# Patient Record
Sex: FEMALE | Race: WHITE | NOT HISPANIC OR LATINO | Employment: FULL TIME | URBAN - METROPOLITAN AREA
[De-identification: names, ages, dates, MRNs, and addresses within clinical notes are randomized per-mention and may not be internally consistent; named-entity substitution may affect disease eponyms.]

---

## 2019-06-30 ENCOUNTER — APPOINTMENT (OUTPATIENT)
Dept: RADIOLOGY | Facility: MEDICAL CENTER | Age: 39
End: 2019-06-30
Attending: PHYSICIAN ASSISTANT
Payer: COMMERCIAL

## 2019-06-30 ENCOUNTER — TRANSCRIBE ORDERS (OUTPATIENT)
Dept: URGENT CARE | Facility: MEDICAL CENTER | Age: 39
End: 2019-06-30

## 2019-06-30 ENCOUNTER — OFFICE VISIT (OUTPATIENT)
Dept: URGENT CARE | Facility: MEDICAL CENTER | Age: 39
End: 2019-06-30
Payer: COMMERCIAL

## 2019-06-30 VITALS
BODY MASS INDEX: 25.16 KG/M2 | WEIGHT: 151 LBS | DIASTOLIC BLOOD PRESSURE: 76 MMHG | HEIGHT: 65 IN | RESPIRATION RATE: 18 BRPM | HEART RATE: 71 BPM | OXYGEN SATURATION: 99 % | TEMPERATURE: 98.8 F | SYSTOLIC BLOOD PRESSURE: 123 MMHG

## 2019-06-30 DIAGNOSIS — M79.671 RIGHT FOOT PAIN: Primary | ICD-10-CM

## 2019-06-30 DIAGNOSIS — M79.671 RIGHT FOOT PAIN: ICD-10-CM

## 2019-06-30 PROCEDURE — 99212 OFFICE O/P EST SF 10 MIN: CPT | Performed by: PHYSICIAN ASSISTANT

## 2019-06-30 PROCEDURE — 73630 X-RAY EXAM OF FOOT: CPT

## 2019-06-30 RX ORDER — VENLAFAXINE HYDROCHLORIDE 75 MG/1
75 CAPSULE, EXTENDED RELEASE ORAL DAILY
Refills: 0 | COMMUNITY
Start: 2019-06-24

## 2019-10-15 ENCOUNTER — TRANSCRIBE ORDERS (OUTPATIENT)
Dept: ADMINISTRATIVE | Facility: HOSPITAL | Age: 39
End: 2019-10-15

## 2019-10-15 DIAGNOSIS — N63.11 LUMP IN UPPER OUTER QUADRANT OF RIGHT BREAST: Primary | ICD-10-CM

## 2019-10-17 ENCOUNTER — HOSPITAL ENCOUNTER (OUTPATIENT)
Dept: MAMMOGRAPHY | Facility: CLINIC | Age: 39
Discharge: HOME | End: 2019-10-17
Payer: COMMERCIAL

## 2019-10-17 ENCOUNTER — HOSPITAL ENCOUNTER (OUTPATIENT)
Dept: ULTRASOUND IMAGING | Facility: CLINIC | Age: 39
Discharge: HOME | End: 2019-10-17
Payer: COMMERCIAL

## 2019-10-17 VITALS — BODY MASS INDEX: 25.16 KG/M2 | HEIGHT: 65 IN | WEIGHT: 151 LBS

## 2019-10-17 DIAGNOSIS — N63.11 LUMP IN UPPER OUTER QUADRANT OF RIGHT BREAST: ICD-10-CM

## 2019-10-17 PROCEDURE — 77066 DX MAMMO INCL CAD BI: CPT

## 2019-10-17 PROCEDURE — G0279 TOMOSYNTHESIS, MAMMO: HCPCS

## 2019-10-17 PROCEDURE — 76642 ULTRASOUND BREAST LIMITED: CPT

## 2020-07-02 ENCOUNTER — TRANSCRIBE ORDERS (OUTPATIENT)
Dept: PHYSICAL THERAPY | Facility: MEDICAL CENTER | Age: 40
End: 2020-07-02

## 2020-07-02 ENCOUNTER — EVALUATION (OUTPATIENT)
Dept: PHYSICAL THERAPY | Facility: MEDICAL CENTER | Age: 40
End: 2020-07-02
Payer: COMMERCIAL

## 2020-07-02 DIAGNOSIS — M25.561 RIGHT KNEE PAIN, UNSPECIFIED CHRONICITY: ICD-10-CM

## 2020-07-02 DIAGNOSIS — S83.511D RUPTURE OF ANTERIOR CRUCIATE LIGAMENT OF RIGHT KNEE, SUBSEQUENT ENCOUNTER: Primary | ICD-10-CM

## 2020-07-02 DIAGNOSIS — Z98.890 S/P ACL RECONSTRUCTION: ICD-10-CM

## 2020-07-02 PROCEDURE — 97161 PT EVAL LOW COMPLEX 20 MIN: CPT | Performed by: PHYSICAL THERAPIST

## 2020-07-02 PROCEDURE — 97110 THERAPEUTIC EXERCISES: CPT | Performed by: PHYSICAL THERAPIST

## 2020-07-02 NOTE — PROGRESS NOTES
PT Evaluation     Today's date: 2020  Patient name: Yuki Her  : 1980  MRN: [id number]  Referring provider: Meng Dumont*  Dx:   Encounter Diagnosis     ICD-10-CM    1  Rupture of anterior cruciate ligament of right knee, subsequent encounter S83 511D    2  Right knee pain, unspecified chronicity M25 561    3  S/P ACL reconstruction [id number]                   Assessment  Assessment details: Pt is a pleasant 44 yo female presenting to physical therapy s/p R knee ACL recon with quad tendon autograph  Pt would benefit from skilled PT to address current impairments and return pt to pre-morbid function  Understanding of Dx/Px/POC: good   Prognosis: good    Goals  Impairment Goals  - Pt I with initial HEP in 1-2 visits  - Improve ROM equal to contralateral side in 4-6 weeks  - Increase strength to 5/5 in all affected areas in 4-6 weeks  - Eliminate swelling in 2-4 weeks    Functional Goals  - Increase FOTO to at least 65 in 12+ weeks  - Patient will be independent with comprehensive HEP in 12+ weeks  - Ambulation is improved to prior level of function in 6-8 weeks  - Stair climbing is improved to prior level of function in 6-8 weeks  - Squatting is improved to prior level of function in 8 weeks  - Pt able to return to pre-morbid function except return to soccer in 12+ weeks    Plan  Patient would benefit from: skilled physical therapy  Other planned modality interventions: Modalities prn   Planned therapy interventions: balance, manual therapy, neuromuscular re-education, patient education, strengthening, stretching, therapeutic activities, therapeutic exercise and home exercise program  Frequency: 2x week  Duration in weeks: 12        Subjective Evaluation    History of Present Illness  Date of surgery: 2020  Mechanism of injury: Pt injured her R knee playing soccer  Subsequent MRI revealed a torn R ACL  Pt underwent an ACL recon 2020, using the quad tendon    Pt has been doing the HEP given to her by her surgeon  Pain  Current pain ratin  At best pain ratin  At worst pain ratin    Social Support    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)  Exercise history: Soccer    Patient Goals  Patient goals for therapy: decreased edema, improved balance, decreased pain, increased motion, increased strength and return to sport/leisure activities          Objective     Neurological Testing     Sensation     Knee   Left Knee   Intact: light touch    Right Knee   Intact: light touch     Active Range of Motion   Left Knee   Hyperextension  Flexion: 155 degrees     Right Knee   Flexion: 70 degrees   Extension: 8 degrees     Mobility   Patellar Mobility:   Left Knee   WFL: medial, lateral, superior and inferior  Right Knee   Hypomobile: medial, lateral, superior and inferior     Strength/Myotome Testing     Left Knee   Prone flexion: 5  Extension: 5  Quadriceps contraction: good    Right Knee   Prone flexion: 2+  Extension: 2+  Quadriceps contraction: poor    Additional Strength Details  Pt is able to perform an I SLR      General Comments:      Knee Comments  + R gastroc and HS tightness    + moderate swelling R knee    Balance: severely decreased R LE             Precautions: s/p R ACL recon      Ther ex /            Bike NV            HS str 3x30"            Gastroc str 3x30"            Heel slides With SO  x30            Table knee flexion x30            QS x 2 5"  x30            SLR flexion 3x10            SLR ab, ad, ext NV            Prone TKEs NV                                                                                                                                                                                                               Gait Training                                       Modalities

## 2020-07-02 NOTE — LETTER
2020    Nuria Green MD  [address] [place] [id number]    Patient: Yuki Her   YOB: 1980   Date of Visit: 2020     Encounter Diagnosis     ICD-10-CM    1  Rupture of anterior cruciate ligament of right knee, subsequent encounter S83 511D    2  Right knee pain, unspecified chronicity M25 561    3  S/P ACL reconstruction S80 870        Dear Dr Morris Justice:    Thank you for your recent referral of Yuki Her  Please review the attached evaluation summary from Neali's recent visit  Please verify that you agree with the plan of care by signing the attached order  If you have any questions or concerns, please do not hesitate to call  I sincerely appreciate the opportunity to share in the care of one of your patients and hope to have another opportunity to work with you in the near future  Sincerely,    Kayley Zamarripa, PT      Referring Provider:      I certify that I have read the below Plan of Care and certify the need for these services furnished under this plan of treatment while under my care  Nuria Green MD  [address] [place] [address],[address]: [phone]          PT Evaluation     Today's date: 2020  Patient name: Yuki Her  : 1980  MRN: [id number]  Referring provider: Meng Dumont*  Dx:   Encounter Diagnosis     ICD-10-CM    1  Rupture of anterior cruciate ligament of right knee, subsequent encounter S83 511D    2  Right knee pain, unspecified chronicity M25 561    3  S/P ACL reconstruction [id number]                   Assessment  Assessment details: Pt is a pleasant 44 yo female presenting to physical therapy s/p R knee ACL recon with quad tendon autograph  Pt would benefit from skilled PT to address current impairments and return pt to pre-morbid function    Understanding of Dx/Px/POC: good   Prognosis: good    Goals  Impairment Goals  - Pt I with initial HEP in 1-2 visits  - Improve ROM equal to contralateral side in 4-6 weeks  - Increase strength to 5/5 in all affected areas in 4-6 weeks  - Eliminate swelling in 2-4 weeks    Functional Goals  - Increase FOTO to at least 65 in 12+ weeks  - Patient will be independent with comprehensive HEP in 12+ weeks  - Ambulation is improved to prior level of function in 6-8 weeks  - Stair climbing is improved to prior level of function in 6-8 weeks  - Squatting is improved to prior level of function in 8 weeks  - Pt able to return to pre-morbid function except return to soccer in 12+ weeks    Plan  Patient would benefit from: skilled physical therapy  Other planned modality interventions: Modalities prn   Planned therapy interventions: balance, manual therapy, neuromuscular re-education, patient education, strengthening, stretching, therapeutic activities, therapeutic exercise and home exercise program  Frequency: 2x week  Duration in weeks: 12        Subjective Evaluation    History of Present Illness  Date of surgery: 2020  Mechanism of injury: Pt injured her R knee playing soccer  Subsequent MRI revealed a torn R ACL  Pt underwent an ACL recon 2020, using the quad tendon  Pt has been doing the HEP given to her by her surgeon  Pain  Current pain ratin  At best pain ratin  At worst pain ratin    Social Support    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)  Exercise history: Soccer    Patient Goals  Patient goals for therapy: decreased edema, improved balance, decreased pain, increased motion, increased strength and return to sport/leisure activities          Objective     Neurological Testing     Sensation     Knee   Left Knee   Intact: light touch    Right Knee   Intact: light touch     Active Range of Motion   Left Knee   Hyperextension  Flexion: 155 degrees     Right Knee   Flexion: 70 degrees   Extension: 8 degrees     Mobility   Patellar Mobility:   Left Knee   WFL: medial, lateral, superior and inferior  Right Knee   Hypomobile: medial, lateral, superior and inferior     Strength/Myotome Testing     Left Knee   Prone flexion: 5  Extension: 5  Quadriceps contraction: good    Right Knee   Prone flexion: 2+  Extension: 2+  Quadriceps contraction: poor    Additional Strength Details  Pt is able to perform an I SLR      General Comments:      Knee Comments  + R gastroc and HS tightness    + moderate swelling R knee    Balance: severely decreased R LE             Precautions: s/p R ACL recon      Ther ex 7/2            Bike NV            HS str 3x30"            Gastroc str 3x30"            Heel slides With SO  x30            Table knee flexion x30            QS x 2 5"  x30            SLR flexion 3x10            SLR ab, ad, ext NV            Prone TKEs NV                                                                                                                                                                                                               Gait Training                                       Modalities

## 2020-07-06 ENCOUNTER — OFFICE VISIT (OUTPATIENT)
Dept: PHYSICAL THERAPY | Facility: MEDICAL CENTER | Age: 40
End: 2020-07-06
Payer: COMMERCIAL

## 2020-07-06 DIAGNOSIS — Z98.890 S/P ACL RECONSTRUCTION: ICD-10-CM

## 2020-07-06 DIAGNOSIS — S83.511D RUPTURE OF ANTERIOR CRUCIATE LIGAMENT OF RIGHT KNEE, SUBSEQUENT ENCOUNTER: Primary | ICD-10-CM

## 2020-07-06 DIAGNOSIS — M25.561 RIGHT KNEE PAIN, UNSPECIFIED CHRONICITY: ICD-10-CM

## 2020-07-06 PROCEDURE — 97112 NEUROMUSCULAR REEDUCATION: CPT | Performed by: PHYSICAL THERAPIST

## 2020-07-06 PROCEDURE — 97110 THERAPEUTIC EXERCISES: CPT | Performed by: PHYSICAL THERAPIST

## 2020-07-06 NOTE — PROGRESS NOTES
Daily Note     Today's date: 2020  Patient name: Silas Camacho  : 1980  MRN: [id number]  Referring provider: Brynn Mcburney*  Dx:   Encounter Diagnosis     ICD-10-CM    1  Rupture of anterior cruciate ligament of right knee, subsequent encounter S83 511D    2  S/P ACL reconstruction Z98 890    3  Right knee pain, unspecified chronicity M25 561             Subjective:  Pt reports that she has been working on her HEP at home  She feels like she can bend her knee better when seated vs supine  Objective: See treatment diary below    R knee: 3-100    Brace opened to 50 degrees    Assessment: Tolerated treatment well  Patient demonstrated fatigue post treatment, exhibited good technique with therapeutic exercises and would benefit from continued PT      Plan: Continue per plan of care         Precautions: s/p R ACL recon      Ther ex            Bike NV 10'           HS str 3x30" 3x30"           Gastroc str 3x30" 3x30"           Heel slides With SO  x30 x30  With SO           Table knee flexion x30 x30           QS x 2 5"  x30 5"  x30           SLR flexion 3x10 3x12           SLR ab, ad, ext NV 3x12           Prone TKEs NV NP                                                                                                                                                                                                              Gait Training                                       Modalities

## 2020-07-09 ENCOUNTER — OFFICE VISIT (OUTPATIENT)
Dept: PHYSICAL THERAPY | Facility: MEDICAL CENTER | Age: 40
End: 2020-07-09
Payer: COMMERCIAL

## 2020-07-09 DIAGNOSIS — Z98.890 S/P ACL RECONSTRUCTION: ICD-10-CM

## 2020-07-09 DIAGNOSIS — S83.511D RUPTURE OF ANTERIOR CRUCIATE LIGAMENT OF RIGHT KNEE, SUBSEQUENT ENCOUNTER: Primary | ICD-10-CM

## 2020-07-09 DIAGNOSIS — M25.561 RIGHT KNEE PAIN, UNSPECIFIED CHRONICITY: ICD-10-CM

## 2020-07-09 PROCEDURE — 97110 THERAPEUTIC EXERCISES: CPT | Performed by: PHYSICAL THERAPIST

## 2020-07-09 PROCEDURE — 97112 NEUROMUSCULAR REEDUCATION: CPT | Performed by: PHYSICAL THERAPIST

## 2020-07-09 NOTE — PROGRESS NOTES
Daily Note     Today's date: 2020  Patient name: Benjamin Mariscal  : 1980  MRN: [id number]  Referring provider: Jens Aviles*  Dx:   Encounter Diagnosis     ICD-10-CM    1  Rupture of anterior cruciate ligament of right knee, subsequent encounter S83 511D    2  S/P ACL reconstruction Z98 890    3  Right knee pain, unspecified chronicity M25 561             Subjective:  Pt reports that her knee has been more achy and throbbing over the past few days  She notes that she may have been more active and on her feet more  Objective: See treatment diary below      Assessment: Tolerated treatment well  Patient demonstrated fatigue post treatment, exhibited good technique with therapeutic exercises and would benefit from continued PT  Pt is gradually improving in all areas  Plan: Continue per plan of care        Precautions: s/p R ACL recon      Ther ex           Bike NV 10' 10'          HS str 3x30" 3x30" 3x30"          Gastroc str 3x30" 3x30" 3x30"          Heel slides With SO  x30 x30  With SO x30  With SO          Table knee flexion x30 x30 x30          QS x 2 5"  x30 5"  x30 5"  x30          SLR flexion 3x10 3x12 1#  3x10          SLR ab, ad, ext NV 3x12 1#  3x10          Prone TKEs NV NP 5"  3x10          Prone flexion   3X10                                                                                                                                                                                                Gait Training                                       Modalities

## 2020-07-13 ENCOUNTER — OFFICE VISIT (OUTPATIENT)
Dept: PHYSICAL THERAPY | Facility: MEDICAL CENTER | Age: 40
End: 2020-07-13
Payer: COMMERCIAL

## 2020-07-13 DIAGNOSIS — Z98.890 S/P ACL RECONSTRUCTION: ICD-10-CM

## 2020-07-13 DIAGNOSIS — M25.561 RIGHT KNEE PAIN, UNSPECIFIED CHRONICITY: ICD-10-CM

## 2020-07-13 DIAGNOSIS — S83.511D RUPTURE OF ANTERIOR CRUCIATE LIGAMENT OF RIGHT KNEE, SUBSEQUENT ENCOUNTER: Primary | ICD-10-CM

## 2020-07-13 PROCEDURE — 97110 THERAPEUTIC EXERCISES: CPT | Performed by: PHYSICAL THERAPIST

## 2020-07-13 PROCEDURE — 97112 NEUROMUSCULAR REEDUCATION: CPT | Performed by: PHYSICAL THERAPIST

## 2020-07-13 NOTE — PROGRESS NOTES
Daily Note     Today's date: 2020  Patient name: Minoo Palacios  : 1980  MRN: [id number]  Referring provider: Sherly Huang*  Dx:   Encounter Diagnosis     ICD-10-CM    1  Rupture of anterior cruciate ligament of right knee, subsequent encounter S83 511D    2  S/P ACL reconstruction Z98 890    3  Right knee pain, unspecified chronicity M25 561             Subjective: Pt reports that her knee is doing well  She is seeing some progress with her quad muscle firing  Objective: See treatment diary below    Pt instructed to concentrate on normal gait mechanics  Pt continues to have a bent knee at IC  Assessment: Tolerated treatment well  Patient demonstrated fatigue post treatment, exhibited good technique with therapeutic exercises and would benefit from continued PT  Plan: Continue per plan of care        Precautions: s/p R ACL recon      Ther ex          Bike NV 10' 10' 10'         HS str 3x30" 3x30" 3x30" 3x30"         Gastroc str 3x30" 3x30" 3x30" 3x30"         Heel slides With SO  x30 x30  With SO x30  With SO x30  With   SO         Table knee flexion x30 x30 x30 D/C         QS x 2 5"  x30 5"  x30 5"  x30 5"  x30         SLR flexion 3x10 3x12 1#  3x10 1#  3x12         SLR ab, ad, ext NV 3x12 1#  3x10 1#  3x12         Prone TKEs NV NP 5"  3x10 5"  3x15         Prone flexion   3X10 1#  3x10         Wall slides             SLS with alpha                                                                                                                                                                         Gait Training                                       Modalities

## 2020-07-16 ENCOUNTER — OFFICE VISIT (OUTPATIENT)
Dept: PHYSICAL THERAPY | Facility: MEDICAL CENTER | Age: 40
End: 2020-07-16
Payer: COMMERCIAL

## 2020-07-16 DIAGNOSIS — S83.511D RUPTURE OF ANTERIOR CRUCIATE LIGAMENT OF RIGHT KNEE, SUBSEQUENT ENCOUNTER: Primary | ICD-10-CM

## 2020-07-16 DIAGNOSIS — Z98.890 S/P ACL RECONSTRUCTION: ICD-10-CM

## 2020-07-16 DIAGNOSIS — M25.561 RIGHT KNEE PAIN, UNSPECIFIED CHRONICITY: ICD-10-CM

## 2020-07-16 PROCEDURE — 97110 THERAPEUTIC EXERCISES: CPT | Performed by: PHYSICAL THERAPIST

## 2020-07-16 PROCEDURE — 97112 NEUROMUSCULAR REEDUCATION: CPT | Performed by: PHYSICAL THERAPIST

## 2020-07-16 NOTE — PROGRESS NOTES
Daily Note     Today's date: 2020  Patient name: Fabby Gee  : 1980  MRN: [id number]  Referring provider: Mari Marcano*  Dx:   Encounter Diagnosis     ICD-10-CM    1  Rupture of anterior cruciate ligament of right knee, subsequent encounter S83 511D    2  S/P ACL reconstruction Z98 890    3  Right knee pain, unspecified chronicity M25 561             Subjective: Pt reports that her leg is feeling fatigued  Overall, she feels like she is improving  Objective: See treatment diary below      Assessment: Tolerated treatment well  Patient demonstrated fatigue post treatment, exhibited good technique with therapeutic exercises and would benefit from continued PT      Plan: Continue per plan of care        Precautions: s/p R ACL recon      Ther ex         Bike NV 10' 10' 10' 10'        HS str 3x30" 3x30" 3x30" 3x30" 3x30"        Gastroc str 3x30" 3x30" 3x30" 3x30" 3x30"        Heel slides With SO  x30 x30  With SO x30  With SO x30  With   SO x30        Table knee flexion x30 x30 x30 D/C D/C        QS x 2 5"  x30 5"  x30 5"  x30 5"  x30 5"  x30        SLR flexion 3x10 3x12 1#  3x10 1#  3x12 2#  3x10        SLR ab, ad, ext NV 3x12 1#  3x10 1#  3x12 2#  3x10        Prone TKEs NV NP 5"  3x10 5"  3x15 5"  3x10  5#        Prone flexion   3X10 1#  3x10 3x15        Wall slides    3x10 3x15        SLS with alpha    2x 3X        Step downs     4"  3x10                                                                                                                                                         Gait Training                                       Modalities

## 2020-07-20 ENCOUNTER — OFFICE VISIT (OUTPATIENT)
Dept: PHYSICAL THERAPY | Facility: MEDICAL CENTER | Age: 40
End: 2020-07-20
Payer: COMMERCIAL

## 2020-07-20 DIAGNOSIS — M25.561 RIGHT KNEE PAIN, UNSPECIFIED CHRONICITY: ICD-10-CM

## 2020-07-20 DIAGNOSIS — Z98.890 S/P ACL RECONSTRUCTION: ICD-10-CM

## 2020-07-20 DIAGNOSIS — S83.511D RUPTURE OF ANTERIOR CRUCIATE LIGAMENT OF RIGHT KNEE, SUBSEQUENT ENCOUNTER: Primary | ICD-10-CM

## 2020-07-20 PROCEDURE — 97112 NEUROMUSCULAR REEDUCATION: CPT | Performed by: PHYSICAL THERAPIST

## 2020-07-20 PROCEDURE — 97110 THERAPEUTIC EXERCISES: CPT | Performed by: PHYSICAL THERAPIST

## 2020-07-20 NOTE — PROGRESS NOTES
Daily Note     Today's date: 2020  Patient name: Yuki Her  : 1980  MRN: [id number]  Referring provider: Meng Dumont*  Dx:   Encounter Diagnosis     ICD-10-CM    1  Rupture of anterior cruciate ligament of right knee, subsequent encounter S83 511D    2  S/P ACL reconstruction Z98 890    3  Right knee pain, unspecified chronicity M25 561          Subjective: Pt reports that her knee has been throbbing at night, but she notes that she has been more active  Objective: See treatment diary below      Assessment: Tolerated treatment well  Patient demonstrated fatigue post treatment, exhibited good technique with therapeutic exercises and would benefit from continued PT  Plan: Continue per plan of care        Precautions: s/p R ACL recon      Ther ex        Bike NV 10' 10' 10' 10' 10'       HS str 3x30" 3x30" 3x30" 3x30" 3x30" 3X30"       Gastroc str 3x30" 3x30" 3x30" 3x30" 3x30" 3x30"       Heel slides With SO  x30 x30  With SO x30  With SO x30  With   SO x30 x30       Table knee flexion x30 x30 x30 D/C D/C        QS x 2 5"  x30 5"  x30 5"  x30 5"  x30 5"  x30 5"  x30       SLR flexion 3x10 3x12 1#  3x10 1#  3x12 2#  3x10 2#  3x10       SLR ab, ad, ext NV 3x12 1#  3x10 1#  3x12 2#  3x10 2#  3x10       Prone TKEs NV NP 5"  3x10 5"  3x15 5"  3x10  5# 10#  5"  3x10       Prone flexion   3X10 1#  3x10 3x15 2#  3x10       Wall slides    3x10 3x15 5#  DB  3x10       SLS with alpha    2x 3X 3X       Step downs     4"  3x10   4#  3x12       Leg Press      90#  3x10                                                                                                                                         Gait Training                                       Modalities

## 2020-07-23 ENCOUNTER — OFFICE VISIT (OUTPATIENT)
Dept: PHYSICAL THERAPY | Facility: MEDICAL CENTER | Age: 40
End: 2020-07-23
Payer: COMMERCIAL

## 2020-07-23 DIAGNOSIS — Z98.890 S/P ACL RECONSTRUCTION: ICD-10-CM

## 2020-07-23 DIAGNOSIS — M25.561 RIGHT KNEE PAIN, UNSPECIFIED CHRONICITY: ICD-10-CM

## 2020-07-23 DIAGNOSIS — S83.511D RUPTURE OF ANTERIOR CRUCIATE LIGAMENT OF RIGHT KNEE, SUBSEQUENT ENCOUNTER: Primary | ICD-10-CM

## 2020-07-23 PROCEDURE — 97110 THERAPEUTIC EXERCISES: CPT | Performed by: PHYSICAL THERAPIST

## 2020-07-23 PROCEDURE — 97112 NEUROMUSCULAR REEDUCATION: CPT | Performed by: PHYSICAL THERAPIST

## 2020-07-23 NOTE — PROGRESS NOTES
Daily Note     Today's date: 2020  Patient name: Yoandy Tinoco  : 1980  MRN: [id number]  Referring provider: Nasrin Borja  Dx:   Encounter Diagnosis     ICD-10-CM    1  Rupture of anterior cruciate ligament of right knee, subsequent encounter S83 511D    2  S/P ACL reconstruction Z98 890    3  Right knee pain, unspecified chronicity M25 561          Subjective: Pt reports that her knee has been less achy, but it has been more stiff  Objective: See treatment diary below      Assessment: Tolerated treatment well  Patient demonstrated fatigue post treatment, exhibited good technique with therapeutic exercises and would benefit from continued PT  Plan: Continue per plan of care        Precautions: s/p R ACL recon      Ther ex       Bike NV 10' 10' 10' 10' 10' 10'      HS str 3x30" 3x30" 3x30" 3x30" 3x30" 3X30" 3x30"      Gastroc str 3x30" 3x30" 3x30" 3x30" 3x30" 3x30" 3x30"      Heel slides With SO  x30 x30  With SO x30  With SO x30  With   SO x30 x30 x30      Table knee flexion x30 x30 x30 D/C D/C        QS x 2 5"  x30 5"  x30 5"  x30 5"  x30 5"  x30 5"  x30 5"  x30      SLR flexion 3x10 3x12 1#  3x10 1#  3x12 2#  3x10 2#  3x10 3#  3x10      SLR ab, ad, ext NV 3x12 1#  3x10 1#  3x12 2#  3x10 2#  3x10 3#  3x10      Prone TKEs NV NP 5"  3x10 5"  3x15 5"  3x10  5# 10#  5"  3x10 Galo  15#  3x10      Prone flexion   3X10 1#  3x10 3x15 2#  3x10 3#  3x10      Wall slides    3x10 3x15 5#  DB  3x10 10#  3x10      SLS with alpha    2x 3X 3X 3X      Step downs     4"  3x10   4"  3x12 6"  3x10        Leg Press      90#  3x10 90#  3x15      Quad str       3x30"      SLRDL       3x10                                                                                                              Gait Training                                       Modalities

## 2020-07-27 ENCOUNTER — OFFICE VISIT (OUTPATIENT)
Dept: PHYSICAL THERAPY | Facility: MEDICAL CENTER | Age: 40
End: 2020-07-27
Payer: COMMERCIAL

## 2020-07-27 DIAGNOSIS — S83.511D RUPTURE OF ANTERIOR CRUCIATE LIGAMENT OF RIGHT KNEE, SUBSEQUENT ENCOUNTER: Primary | ICD-10-CM

## 2020-07-27 DIAGNOSIS — Z98.890 S/P ACL RECONSTRUCTION: ICD-10-CM

## 2020-07-27 DIAGNOSIS — M25.561 RIGHT KNEE PAIN, UNSPECIFIED CHRONICITY: ICD-10-CM

## 2020-07-27 PROCEDURE — 97110 THERAPEUTIC EXERCISES: CPT | Performed by: PHYSICAL THERAPIST

## 2020-07-27 PROCEDURE — 97112 NEUROMUSCULAR REEDUCATION: CPT | Performed by: PHYSICAL THERAPIST

## 2020-07-27 NOTE — PROGRESS NOTES
Daily Note     Today's date: 2020  Patient name: Ruben Eagle  : 1980  MRN: [id number]  Referring provider: Salvatore Sierra*  Dx:   Encounter Diagnosis     ICD-10-CM    1  Rupture of anterior cruciate ligament of right knee, subsequent encounter S83 511D    2  S/P ACL reconstruction Z98 890    3  Right knee pain, unspecified chronicity M25 561             Subjective: Pts main complaint at this point is stiffness in her distal quad around her incision  Objective: See treatment diary below      Assessment: Tolerated treatment well  Patient demonstrated fatigue post treatment, exhibited good technique with therapeutic exercises and would benefit from continued PT  Pt is progressing appropriately in all areas  Plan: Continue per plan of care        Precautions: s/p R ACL recon      Ther ex      Bike NV 10' 10' 10' 10' 10' 10' 10'     HS str 3x30" 3x30" 3x30" 3x30" 3x30" 3X30" 3x30" 3x30"     Gastroc str 3x30" 3x30" 3x30" 3x30" 3x30" 3x30" 3x30" 3x30"     Heel slides With SO  x30 x30  With SO x30  With SO x30  With   SO x30 x30 x30 x30     Table knee flexion x30 x30 x30 D/C D/C        QS x 2 5"  x30 5"  x30 5"  x30 5"  x30 5"  x30 5"  x30 5"  x30 5"  x30     SLR flexion 3x10 3x12 1#  3x10 1#  3x12 2#  3x10 2#  3x10 3#  3x10 3#  3x10     SLR ab, ad, ext NV 3x12 1#  3x10 1#  3x12 2#  3x10 2#  3x10 3#  3x10 3#  3x10     Prone TKEs NV NP 5"  3x10 5"  3x15 5"  3x10  5# 10#  5"  3x10 Galo  15#  3x10 15#  3x10     Prone flexion   3X10 1#  3x10 3x15 2#  3x10 3#  3x10 3#  3x10     Wall slides    3x10 3x15 5#  DB  3x10 10#  3x10 10#  3x10     SLS with alpha    2x 3X 3X 3X 3X     Step downs     4"  3x10   4"  3x12 6"  3x10   6"  3x12     Leg Press      90#  3x10 90#  3x15 100#  3x10     Quad str       3x30" 3x30"     SLRDL       3x10 3x10                                                                                                             Gait Training Modalities

## 2020-07-30 ENCOUNTER — OFFICE VISIT (OUTPATIENT)
Dept: PHYSICAL THERAPY | Facility: MEDICAL CENTER | Age: 40
End: 2020-07-30
Payer: COMMERCIAL

## 2020-07-30 DIAGNOSIS — Z98.890 S/P ACL RECONSTRUCTION: ICD-10-CM

## 2020-07-30 DIAGNOSIS — M25.561 RIGHT KNEE PAIN, UNSPECIFIED CHRONICITY: ICD-10-CM

## 2020-07-30 DIAGNOSIS — S83.511D RUPTURE OF ANTERIOR CRUCIATE LIGAMENT OF RIGHT KNEE, SUBSEQUENT ENCOUNTER: Primary | ICD-10-CM

## 2020-07-30 PROCEDURE — 97110 THERAPEUTIC EXERCISES: CPT | Performed by: PHYSICAL THERAPIST

## 2020-07-30 PROCEDURE — 97112 NEUROMUSCULAR REEDUCATION: CPT | Performed by: PHYSICAL THERAPIST

## 2020-07-30 NOTE — PROGRESS NOTES
Daily Note     Today's date: 2020  Patient name: Harmony Balderas  : 1980  MRN: [id number]  Referring provider: Grey Betts*  Dx:   Encounter Diagnosis     ICD-10-CM    1  Rupture of anterior cruciate ligament of right knee, subsequent encounter S83 511D    2  S/P ACL reconstruction Z98 890    3  Right knee pain, unspecified chronicity M25 561             Subjective: Pt reports that she had a follow up with the surgeon's office and they were pleased with her progress  Objective: See treatment diary below      Assessment: Tolerated treatment well  Patient demonstrated fatigue post treatment, exhibited good technique with therapeutic exercises and would benefit from continued PT  Pt is doing well and progressing appropriately  Plan: Continue per plan of care        Precautions: s/p R ACL recon      Ther ex     Bike NV 10' 10' 10' 10' 10' 10' 10' 10'    HS str 3x30" 3x30" 3x30" 3x30" 3x30" 3X30" 3x30" 3x30" 3X30"    Gastroc str 3x30" 3x30" 3x30" 3x30" 3x30" 3x30" 3x30" 3x30" 3x30"    Heel slides With SO  x30 x30  With SO x30  With SO x30  With   SO x30 x30 x30 x30 x30    Table knee flexion x30 x30 x30 D/C D/C        QS x 2 5"  x30 5"  x30 5"  x30 5"  x30 5"  x30 5"  x30 5"  x30 5"  x30 5"  x30    SLR flexion 3x10 3x12 1#  3x10 1#  3x12 2#  3x10 2#  3x10 3#  3x10 3#  3x10 3#  3x12    SLR ab, ad, ext NV 3x12 1#  3x10 1#  3x12 2#  3x10 2#  3x10 3#  3x10 3#  3x10 3#  3x12    Prone TKEs NV NP 5"  3x10 5"  3x15 5"  3x10  5# 10#  5"  3x10 Galo  15#  3x10 15#  3x10 15#  3x12    Prone flexion   3X10 1#  3x10 3x15 2#  3x10 3#  3x10 3#  3x10 3#  3x12    Wall slides    3x10 3x15 5#  DB  3x10 10#  3x10 10#  3x10 15#  3x10    SLS with alpha    2x 3X 3X 3X 3X 3X    Step downs     4"  3x10   4"  3x12 6"  3x10   6"  3x12 8"  3x10    Leg Press      90#  3x10 90#  3x15 100#  3x10 100#  3x10    Quad str       3x30" 3x30" 3x30"    SLRDL       3x10 3x10 3x12 SLS with BT        3x15  2# 3x15  2#                                                                                               Gait Training                                       Modalities

## 2020-08-10 ENCOUNTER — OFFICE VISIT (OUTPATIENT)
Dept: PHYSICAL THERAPY | Facility: MEDICAL CENTER | Age: 40
End: 2020-08-10
Payer: COMMERCIAL

## 2020-08-10 DIAGNOSIS — S83.511D RUPTURE OF ANTERIOR CRUCIATE LIGAMENT OF RIGHT KNEE, SUBSEQUENT ENCOUNTER: Primary | ICD-10-CM

## 2020-08-10 DIAGNOSIS — Z98.890 S/P ACL RECONSTRUCTION: ICD-10-CM

## 2020-08-10 DIAGNOSIS — M25.561 RIGHT KNEE PAIN, UNSPECIFIED CHRONICITY: ICD-10-CM

## 2020-08-10 PROCEDURE — 97110 THERAPEUTIC EXERCISES: CPT | Performed by: PHYSICAL THERAPIST

## 2020-08-10 PROCEDURE — 97112 NEUROMUSCULAR REEDUCATION: CPT | Performed by: PHYSICAL THERAPIST

## 2020-08-10 NOTE — PROGRESS NOTES
Daily Note     Today's date: 8/10/2020  Patient name: Harmony Balderas  : 1980  MRN: [id number]  Referring provider: Grey Betts*  Dx:   Encounter Diagnosis     ICD-10-CM    1  Rupture of anterior cruciate ligament of right knee, subsequent encounter  S83 511D    2  S/P ACL reconstruction  Z98 890    3  Right knee pain, unspecified chronicity  M25 561             Subjective: Pt reports that her knee has been really stiff  She [place] the beach [place] and was walking on the sand a lot  Objective: See treatment diary below      Assessment: Tolerated treatment well  Patient demonstrated fatigue post treatment, exhibited good technique with therapeutic exercises and would benefit from continued PT  Plan: Continue per plan of care        Precautions: s/p R ACL recon      Ther ex 7/2 7/6 7/9 7/13 7/16 7/20 7/23 7/27 7/30 8/10   Bike NV 10' 10' 10' 10' 10' 10' 10' 10' 10'   HS str 3x30" 3x30" 3x30" 3x30" 3x30" 3X30" 3x30" 3x30" 3X30" 3x30"   Gastroc str 3x30" 3x30" 3x30" 3x30" 3x30" 3x30" 3x30" 3x30" 3x30" 3x30"   Heel slides With SO  x30 x30  With SO x30  With SO x30  With   SO x30 x30 x30 x30 x30 x30   Table knee flexion x30 x30 x30 D/C D/C        QS x 2 5"  x30 5"  x30 5"  x30 5"  x30 5"  x30 5"  x30 5"  x30 5"  x30 5"  x30 5"  x30   SLR flexion 3x10 3x12 1#  3x10 1#  3x12 2#  3x10 2#  3x10 3#  3x10 3#  3x10 3#  3x12 3#  3x15   SLR ab, ad, ext NV 3x12 1#  3x10 1#  3x12 2#  3x10 2#  3x10 3#  3x10 3#  3x10 3#  3x12 3#  3x15   Prone TKEs NV NP 5"  3x10 5"  3x15 5"  3x10  5# 10#  5"  3x10 [place]  15#  3x10 15#  3x10 15#  3x12 15#  3x15   Prone flexion   3X10 1#  3x10 3x15 2#  3x10 3#  3x10 3#  3x10 3#  3x12 3#  3x15   Wall slides    3x10 3x15 5#  DB  3x10 10#  3x10 10#  3x10 15#  3x10 15#  3x10   SLS with alpha    2x 3X 3X 3X 3X 3X 2#  3X   Step downs     4"  3x10   4"  3x12 6"  3x10   6"  3x12 8"  3x10 8"  3x10   Leg Press      90#  3x10 90#  3x15 100#  3x10 100#  3x10 110#  3x10   Quad str 3x30" 3x30" 3x30" 3x30"   SLRDL       3x10 3x10 3x12 3x15   SLS with BT        3x15  2# 3x15  2# 2#  3x15   Heel raises          3x15                                                                                 Gait Training                                       Modalities

## 2020-08-13 ENCOUNTER — OFFICE VISIT (OUTPATIENT)
Dept: PHYSICAL THERAPY | Facility: MEDICAL CENTER | Age: 40
End: 2020-08-13
Payer: COMMERCIAL

## 2020-08-13 DIAGNOSIS — Z98.890 S/P ACL RECONSTRUCTION: ICD-10-CM

## 2020-08-13 DIAGNOSIS — M25.561 RIGHT KNEE PAIN, UNSPECIFIED CHRONICITY: ICD-10-CM

## 2020-08-13 DIAGNOSIS — S83.511D RUPTURE OF ANTERIOR CRUCIATE LIGAMENT OF RIGHT KNEE, SUBSEQUENT ENCOUNTER: Primary | ICD-10-CM

## 2020-08-13 PROCEDURE — 97110 THERAPEUTIC EXERCISES: CPT | Performed by: PHYSICAL THERAPIST

## 2020-08-13 PROCEDURE — 97112 NEUROMUSCULAR REEDUCATION: CPT | Performed by: PHYSICAL THERAPIST

## 2020-08-13 NOTE — PROGRESS NOTES
Daily Note     Today's date: 2020  Patient name: Svetlana Sarmiento  : 1980  MRN: [id number]  Referring provider: Ramón Galeas*  Dx:   Encounter Diagnosis     ICD-10-CM    1  Rupture of anterior cruciate ligament of right knee, subsequent encounter  S83 511D    2  S/P ACL reconstruction  Z98 890    3  Right knee pain, unspecified chronicity  M25 561          Subjective: Pt is frustrated that she cannot hyperextend her knee well and still feels like her quad is weak  She does see overall progress, but would like it to be faster  Objective: See treatment diary below      Assessment: Tolerated treatment well  Patient demonstrated fatigue post treatment, exhibited good technique with therapeutic exercises and would benefit from continued PT  Pt is doing well and progressing appropriately  Plan: Continue per plan of care        Precautions: s/p R ACL recon      Ther ex             Bike 10'            HS str 3x30"            Gastroc str 3x30"            Heel slides With SO  x30            QS x 2 5"  x30            SLR flexion 4#  3x10            SLR ab, ad, ext 4#  3x10            Prone TKEs 18#  3x15            Standing knee flexion 4#  3x10            Wall slides 15#  3x15            SLS with alpha 2#  3X            Step downs 8"  3x10            Leg Press 120#  3x10            Quad str 3x30"            SLRDL 3x15            SLS with BT 2#  3x15            Heel raises 3x15                                                                                          Gait Training                                       Modalities

## 2020-08-17 ENCOUNTER — OFFICE VISIT (OUTPATIENT)
Dept: PHYSICAL THERAPY | Facility: MEDICAL CENTER | Age: 40
End: 2020-08-17
Payer: COMMERCIAL

## 2020-08-17 DIAGNOSIS — M25.561 RIGHT KNEE PAIN, UNSPECIFIED CHRONICITY: ICD-10-CM

## 2020-08-17 DIAGNOSIS — S83.511D RUPTURE OF ANTERIOR CRUCIATE LIGAMENT OF RIGHT KNEE, SUBSEQUENT ENCOUNTER: Primary | ICD-10-CM

## 2020-08-17 DIAGNOSIS — Z98.890 S/P ACL RECONSTRUCTION: ICD-10-CM

## 2020-08-17 PROCEDURE — 97112 NEUROMUSCULAR REEDUCATION: CPT | Performed by: PHYSICAL THERAPIST

## 2020-08-17 PROCEDURE — 97110 THERAPEUTIC EXERCISES: CPT | Performed by: PHYSICAL THERAPIST

## 2020-08-17 NOTE — PROGRESS NOTES
Daily Note     Today's date: 2020  Patient name: Monika Larson  : 1980  MRN: [id number]  Referring provider: Ana Villar*  Dx:   Encounter Diagnosis     ICD-10-CM    1  Rupture of anterior cruciate ligament of right knee, subsequent encounter  S83 511D    2  S/P ACL reconstruction  Z98 890    3  Right knee pain, unspecified chronicity  M25 561             Subjective: Pt reports that she went for a 20 minute walk yesterday and when she woke up this morning her knee was stiff and sore  Objective: See treatment diary below      Assessment: Tolerated treatment well  Patient demonstrated fatigue post treatment, exhibited good technique with therapeutic exercises and would benefit from continued PT  Pt was challenged by the exercise additions/progressions today  Plan: Continue per plan of care        Precautions: s/p R ACL recon      Ther ex            Bike 10' 10'           HS str 3x30" 3X30"           Gastroc str 3x30" 3x30"           Heel slides With SO  x30 x30           QS x 2 5"  x30 5"  x30           SLR flexion 4#  3x10 4#  3x12           SLR ab, ad, ext 4#  3x10 4#  3x12           Prone TKEs 18#  3x15  claudy [place]  18#  3x15           Standing knee flexion 4#  3x10 4#  3x12           Wall slides 15#  3x15 20#  3x10           SLS with alpha 2#  3X 2#  3X           Step downs 8"  3x10 8"  3x15           Leg Press 120#  3x10 120#  3X12           Quad str 3x30" 3x30"           SLRDL 3x15 3x15           SLS with BT 2#  3x15 2#  3x15           Heel raises 3x15 3x15           Banded squats  purp  3x10                                                                            Gait Training                                       Modalities

## 2020-08-20 ENCOUNTER — OFFICE VISIT (OUTPATIENT)
Dept: PHYSICAL THERAPY | Facility: MEDICAL CENTER | Age: 40
End: 2020-08-20
Payer: COMMERCIAL

## 2020-08-20 DIAGNOSIS — Z98.890 S/P ACL RECONSTRUCTION: ICD-10-CM

## 2020-08-20 DIAGNOSIS — S83.511D RUPTURE OF ANTERIOR CRUCIATE LIGAMENT OF RIGHT KNEE, SUBSEQUENT ENCOUNTER: Primary | ICD-10-CM

## 2020-08-20 DIAGNOSIS — M25.561 RIGHT KNEE PAIN, UNSPECIFIED CHRONICITY: ICD-10-CM

## 2020-08-20 PROCEDURE — 97110 THERAPEUTIC EXERCISES: CPT | Performed by: PHYSICAL THERAPIST

## 2020-08-20 PROCEDURE — 97112 NEUROMUSCULAR REEDUCATION: CPT | Performed by: PHYSICAL THERAPIST

## 2020-08-20 NOTE — PROGRESS NOTES
Daily Note     Today's date: 2020  Patient name: Mckinley Weber  : 1980  MRN: [id number]  Referring provider: Kristin Joy*  Dx:   Encounter Diagnosis     ICD-10-CM    1  Rupture of anterior cruciate ligament of right knee, subsequent encounter  S83 511D    2  S/P ACL reconstruction  Z98 890    3  Right knee pain, unspecified chronicity  M25 561             Subjective: Pt reports that she is doing well today  Objective: See treatment diary below      Assessment: Tolerated treatment well  Patient demonstrated fatigue post treatment, exhibited good technique with therapeutic exercises and would benefit from continued PT  Plan: Continue per plan of care        Precautions: s/p R ACL recon      Ther ex           Bike 10' 10' 10'          HS str 3x30" 3X30" 3x30"          Gastroc str 3x30" 3x30" 3x30"          Heel slides With SO  x30 x30 x30          QS x 2 5"  x30 5"  x30 5"  x30          SLR flexion 4#  3x10 4#  3x12 4#  3x15          SLR ab, ad, ext 4#  3x10 4#  3x12 4#  3x15          Prone TKEs 18#  3x15  [place]  18#  3x15 20#  3x10          Standing knee flexion 4#  3x10 4#  3x12 4#  3x15          Wall slides 15#  3x15 20#  3x10 20#  3x10          SLS with alpha 2#  3X 2#  3X 2#  3X          Step downs 8"  3x10 8"  3x15 8"  3X15          Leg Press 120#  3x10 120#  3X12 120#  3X15          Quad str 3x30" 3x30" 3x30"          SLRDL 3x15 3x15 10#  3x10          SLS with BT 2#  3x15 2#  3x15 2#  3x15          Heel raises 3x15 3x15 3x15          Banded squats  purp  3x10 purp  3x12                                                                           Gait Training                                       Modalities

## 2020-08-21 NOTE — PROGRESS NOTES
PT RE-Evaluation     Today's date: 2020  Patient name: Suzie Og  : 1980  MRN: [id number]  Referring provider: Felipe Price  Dx:   Encounter Diagnosis     ICD-10-CM    1  Rupture of anterior cruciate ligament of right knee, subsequent encounter S83 511D    2  Right knee pain, unspecified chronicity M25 561    3  S/P ACL reconstruction [id number]                   Assessment  Assessment details: Pt is a pleasant 44 yo female presenting to physical therapy s/p R knee ACL recon with quad tendon autograph  Pt would benefit from continued skilled PT to address current impairments and return pt to pre-morbid function  Understanding of Dx/Px/POC: good   Prognosis: good    Goals  Impairment Goals  - Pt I with initial HEP in 1-2 visits   Met  - Improve ROM equal to contralateral side in 4-6 weeks  Not met  - Increase strength to 5/5 in all affected areas in 4-6 weeks    Not met  - Eliminate swelling in 2-4 weeks   Met     Functional Goals  - Increase FOTO to at least 65 in 12+ weeks   Not met  - Patient will be independent with comprehensive HEP in 12+ weeks    Not met  - Ambulation is improved to prior level of function in 6-8 weeks    Met  - Stair climbing is improved to prior level of function in 6-8 weeks   Not met  - Squatting is improved to prior level of function in 8 weeks    Not met  - Pt able to return to pre-morbid function except return to soccer in 12+ weeks   Not met    Plan  Patient would benefit from: skilled physical therapy  Other planned modality interventions: Modalities prn   Planned therapy interventions: balance, manual therapy, neuromuscular re-education, patient education, strengthening, stretching, therapeutic activities, therapeutic exercise and home exercise program  Frequency: 2x week  Duration in weeks: 8        Subjective Evaluation    History of Present Illness  Date of surgery: 2020  Mechanism of injury: Pt injured her R knee playing soccer    Subsequent MRI revealed a torn R ACL  Pt underwent an ACL recon 2020, using the quad tendon  Pt has been doing the HEP given to her by her surgeon  Update:     Pain  Current pain ratin  At best pain ratin  At worst pain ratin    Pt reports that she is progressing  She still gets swelling and stiffness with prolonged activity, but overall everything is improving  Social Support    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)  Exercise history: Soccer    Patient Goals  Patient goals for therapy: decreased edema, improved balance, decreased pain, increased motion, increased strength and return to sport/leisure activities          Objective     Neurological Testing     Sensation     Knee   Left Knee   Intact: light touch    Right Knee   Intact: light touch     Active Range of Motion   Left Knee   Hyperextension  Flexion: 155 degrees     Right Knee   Flexion: 150 degrees   Extension: -3 degrees     Mobility   Patellar Mobility:   Left Knee   WFL: medial, lateral, superior and inferior       Right Knee   WFL: medial, lateral, superior and inferior     Strength/Myotome Testing     Left Knee   Prone flexion: 5  Extension: 5  Quadriceps contraction: good    Right Knee   Prone flexion: 4  Extension: 4+  Quadriceps contraction: poor    Hips:    Flexion: 4  Extension: 4  Abd: 4  Add: 4    General Comments:      Knee Comments  + R gastroc and HS tightness    - swelling R knee    Balance: modately decreased R LE

## 2020-08-24 ENCOUNTER — OFFICE VISIT (OUTPATIENT)
Dept: PHYSICAL THERAPY | Facility: MEDICAL CENTER | Age: 40
End: 2020-08-24
Payer: COMMERCIAL

## 2020-08-24 DIAGNOSIS — S83.511D RUPTURE OF ANTERIOR CRUCIATE LIGAMENT OF RIGHT KNEE, SUBSEQUENT ENCOUNTER: Primary | ICD-10-CM

## 2020-08-24 DIAGNOSIS — Z98.890 S/P ACL RECONSTRUCTION: ICD-10-CM

## 2020-08-24 DIAGNOSIS — M25.561 RIGHT KNEE PAIN, UNSPECIFIED CHRONICITY: ICD-10-CM

## 2020-08-24 PROCEDURE — 97110 THERAPEUTIC EXERCISES: CPT | Performed by: PHYSICAL THERAPIST

## 2020-08-24 PROCEDURE — 97112 NEUROMUSCULAR REEDUCATION: CPT | Performed by: PHYSICAL THERAPIST

## 2020-08-24 NOTE — PROGRESS NOTES
Daily Note     Today's date: 2020  Patient name: Sandee Garcia  : 1980  MRN: [id number]  Referring provider: Rosemary Michele*  Dx:   Encounter Diagnosis     ICD-10-CM    1  Rupture of anterior cruciate ligament of right knee, subsequent encounter  S83 511D    2  S/P ACL reconstruction  Z98 890    3  Right knee pain, unspecified chronicity  M25 561             Subjective:  Pt reports that her knee is doing well  Pt with no significant changes to report  Pt with no complaints today  Objective: See treatment diary below      Assessment: Tolerated treatment well  Patient demonstrated fatigue post treatment, exhibited good technique with therapeutic exercises and would benefit from continued PT  Pts strength is slowly improving  Plan: Continue per plan of care        Precautions: s/p R ACL recon      Ther ex          Bike 10' 10' 10' 10'         HS str 3x30" 3X30" 3x30" 3x30"         Gastroc str 3x30" 3x30" 3x30" 3x30"         Heel slides With SO  x30 x30 x30 x30         QS x 2 5"  x30 5"  x30 5"  x30 5"  x30         SLR flexion 4#  3x10 4#  3x12 4#  3x15 5#  3x10         SLR ab, ad, ext 4#  3x10 4#  3x12 4#  3x15 5#  3x10         Prone TKEs 18#  3x15  dalton [place]  18#  3x15 20#  3x10 20#  3x15         Standing knee flexion 4#  3x10 4#  3x12 4#  3x15 5#  3x10         Wall slides 15#  3x15 20#  3x10 20#  3x10 20#  3x10         SLS with alpha 2#  3X 2#  3X 2#  3X 2#  3X         Step downs 8"  3x10 8"  3x15 8"  3X15 8"  3x15         Leg Press 120#  3x10 120#  3X12 120#  3X15 120#  3x15         Quad str 3x30" 3x30" 3x30" 3x30"         SLRDL 3x15 3x15 10#  3x10 10#  3x12         SLS with BT 2#  3x15 2#  3x15 2#  3x15 2#  3x15         Heel raises 3x15 3x15 3x15 3x15         Banded squats  purp  3x10 purp  3x12 purp  3x15                                                                          Gait Training                                       Modalities

## 2020-08-27 ENCOUNTER — OFFICE VISIT (OUTPATIENT)
Dept: PHYSICAL THERAPY | Facility: MEDICAL CENTER | Age: 40
End: 2020-08-27
Payer: COMMERCIAL

## 2020-08-27 DIAGNOSIS — S83.511D RUPTURE OF ANTERIOR CRUCIATE LIGAMENT OF RIGHT KNEE, SUBSEQUENT ENCOUNTER: ICD-10-CM

## 2020-08-27 DIAGNOSIS — M25.561 RIGHT KNEE PAIN, UNSPECIFIED CHRONICITY: ICD-10-CM

## 2020-08-27 DIAGNOSIS — Z98.890 S/P ACL RECONSTRUCTION: Primary | ICD-10-CM

## 2020-08-27 PROCEDURE — 97110 THERAPEUTIC EXERCISES: CPT | Performed by: PHYSICAL THERAPIST

## 2020-08-27 PROCEDURE — 97112 NEUROMUSCULAR REEDUCATION: CPT | Performed by: PHYSICAL THERAPIST

## 2020-08-27 NOTE — PROGRESS NOTES
Daily Note     Today's date: 2020  Patient name: Mckinley Weber  : 1980  MRN: [id number]  Referring provider: Kristin Joy*  Dx:   Encounter Diagnosis     ICD-10-CM    1  S/P ACL reconstruction  Z98 890    2  Rupture of anterior cruciate ligament of right knee, subsequent encounter  S83 511D    3  Right knee pain, unspecified chronicity  M25 561          Subjective:  Pt reports that her knee is stiff and sore today  She is eager to get her leg back to normal         Objective: See treatment diary below      Assessment: Tolerated treatment well  Patient demonstrated fatigue post treatment, exhibited good technique with therapeutic exercises and would benefit from continued PT      Plan: Continue per plan of care        Precautions: s/p R ACL recon      Ther ex         Bike 10' 10' 10' 10' 10'        HS str 3x30" 3X30" 3x30" 3x30" 3x30"        Gastroc str 3x30" 3x30" 3x30" 3x30" 3x30"        Heel slides With SO  x30 x30 x30 x30 x30        QS x 2 5"  x30 5"  x30 5"  x30 5"  x30 5"  x30        SLR flexion 4#  3x10 4#  3x12 4#  3x15 5#  3x10 5#  3x10        SLR ab, ad, ext 4#  3x10 4#  3x12 4#  3x15 5#  3x10 5#  3x10        Prone TKEs 18#  3x15  [place]  18#  3x15 20#  3x10 20#  3x15 20#  3x15        Standing knee flexion 4#  3x10 4#  3x12 4#  3x15 5#  3x10 5#  3x10        Wall slides 15#  3x15 20#  3x10 20#  3x10 20#  3x10 20#  3x12        SLS with alpha 2#  3X 2#  3X 2#  3X 2#  3X 2#  3x        Step downs 8"  3x10 8"  3x15 8"  3X15 8"  3x15 8"  3x15        Leg Press 120#  3x10 120#  3X12 120#  3X15 120#  3x15 125#  3x10        Quad str 3x30" 3x30" 3x30" 3x30" 3x30"        SLRDL 3x15 3x15 10#  3x10 10#  3x12 10#  3X15        SLS with BT 2#  3x15 2#  3x15 2#  3x15 2#  3x15 2#  3x15        Heel raises 3x15 3x15 3x15 3x15 3x15        Banded squats  purp  3x10 purp  3x12 purp  3x15 purp  3x15                                                                         Gait Training                                       Modalities

## 2020-08-31 ENCOUNTER — OFFICE VISIT (OUTPATIENT)
Dept: PHYSICAL THERAPY | Facility: MEDICAL CENTER | Age: 40
End: 2020-08-31
Payer: COMMERCIAL

## 2020-08-31 DIAGNOSIS — S83.511D RUPTURE OF ANTERIOR CRUCIATE LIGAMENT OF RIGHT KNEE, SUBSEQUENT ENCOUNTER: ICD-10-CM

## 2020-08-31 DIAGNOSIS — M25.561 RIGHT KNEE PAIN, UNSPECIFIED CHRONICITY: ICD-10-CM

## 2020-08-31 DIAGNOSIS — Z98.890 S/P ACL RECONSTRUCTION: Primary | ICD-10-CM

## 2020-08-31 PROCEDURE — 97112 NEUROMUSCULAR REEDUCATION: CPT | Performed by: PHYSICAL THERAPIST

## 2020-08-31 PROCEDURE — 97110 THERAPEUTIC EXERCISES: CPT | Performed by: PHYSICAL THERAPIST

## 2020-08-31 NOTE — PROGRESS NOTES
Daily Note     Today's date: 2020  Patient name: Aron Lowry  : 1980  MRN: [id number]  Referring provider: Oliverio Monroe*  Dx:   Encounter Diagnosis     ICD-10-CM    1  S/P ACL reconstruction  Z98 890    2  Rupture of anterior cruciate ligament of right knee, subsequent encounter  S83 511D    3  Right knee pain, unspecified chronicity  M25 561          Subjective: Pt reports that she is doing well  She got a [place] and was able to do some standing cycling  She felt like that loosened her knee up  Objective: See treatment diary below      Assessment: Tolerated treatment well  Patient demonstrated fatigue post treatment, exhibited good technique with therapeutic exercises and would benefit from continued PT      Plan: Continue per plan of care        Precautions: s/p R ACL recon      Ther ex        Bike 10' 10' 10' 10' 10' Ellip  10'       HS str 3x30" 3X30" 3x30" 3x30" 3x30" 3x30"       Gastroc str 3x30" 3x30" 3x30" 3x30" 3x30" 3x30"       Heel slides With SO  x30 x30 x30 x30 x30 x30       QS x 2 5"  x30 5"  x30 5"  x30 5"  x30 5"  x30 5"  x30       SLR flexion 4#  3x10 4#  3x12 4#  3x15 5#  3x10 5#  3x10 5#  3x10       SLR ab, ad, ext 4#  3x10 4#  3x12 4#  3x15 5#  3x10 5#  3x10 5#  3x10       Prone TKEs 18#  3x15  claudy [place]  18#  3x15 20#  3x10 20#  3x15 20#  3x15 20 6#  3x15       Standing knee flexion 4#  3x10 4#  3x12 4#  3x15 5#  3x10 5#  3x10 5#  3x10       Wall slides 15#  3x15 20#  3x10 20#  3x10 20#  3x10 20#  3x12 20#  3x15       SLS with alpha 2#  3X 2#  3X 2#  3X 2#  3X 2#  3x 2#  3X       Step downs 8"  3x10 8"  3x15 8"  3X15 8"  3x15 8"  3x15 8"  3x15       Leg Press 120#  3x10 120#  3X12 120#  3X15 120#  3x15 125#  3x10 125#  3x10       Quad str 3x30" 3x30" 3x30" 3x30" 3x30" 3x30"       SLRDL 3x15 3x15 10#  3x10 10#  3x12 10#  3X15 15#  3x10       SLS with BT 2#  3x15 2#  3x15 2#  3x15 2#  3x15 2#  3x15 2#  3x15       Heel raises 3x15 3x15 3x15 3x15 3x15 3x15       Banded squats  purp  3x10 purp  3x12 purp  3x15 purp  3x15 purp  3x15                                                                        Gait Training                                       Modalities

## 2020-09-03 ENCOUNTER — OFFICE VISIT (OUTPATIENT)
Dept: PHYSICAL THERAPY | Facility: MEDICAL CENTER | Age: 40
End: 2020-09-03
Payer: COMMERCIAL

## 2020-09-03 DIAGNOSIS — S83.511D RUPTURE OF ANTERIOR CRUCIATE LIGAMENT OF RIGHT KNEE, SUBSEQUENT ENCOUNTER: ICD-10-CM

## 2020-09-03 DIAGNOSIS — M25.561 RIGHT KNEE PAIN, UNSPECIFIED CHRONICITY: ICD-10-CM

## 2020-09-03 DIAGNOSIS — Z98.890 S/P ACL RECONSTRUCTION: Primary | ICD-10-CM

## 2020-09-03 PROCEDURE — 97110 THERAPEUTIC EXERCISES: CPT | Performed by: PHYSICAL THERAPIST

## 2020-09-03 PROCEDURE — 97112 NEUROMUSCULAR REEDUCATION: CPT | Performed by: PHYSICAL THERAPIST

## 2020-09-03 NOTE — PROGRESS NOTES
Daily Note     Today's date: 9/3/2020  Patient name: David Guadarrama  : 1980  MRN: [id number]  Referring provider: Glenna Laboy*  Dx:   Encounter Diagnosis     ICD-10-CM    1  S/P ACL reconstruction  Z98 890    2  Rupture of anterior cruciate ligament of right knee, subsequent encounter  S83 511D    3  Right knee pain, unspecified chronicity  M25 561          Subjective: Pt reports that her knee is stiff and sore today  She has been doing more standing and that standing is in heels  Objective: See treatment diary below      Assessment: Tolerated treatment well  Patient demonstrated fatigue post treatment, exhibited good technique with therapeutic exercises and would benefit from continued PT      Plan: Continue per plan of care        Precautions: s/p R ACL recon      Ther ex 8/13 8/17 8/20 8/24 8/27 8/31 9/3      Bike 10' 10' 10' 10' 10' Ellip  10' Bike  10'      HS str 3x30" 3X30" 3x30" 3x30" 3x30" 3x30" 3x30"      Gastroc str 3x30" 3x30" 3x30" 3x30" 3x30" 3x30" 3x30"      Heel slides With SO  x30 x30 x30 x30 x30 x30 x30      QS x 2 5"  x30 5"  x30 5"  x30 5"  x30 5"  x30 5"  x30 5"  x30      SLR flexion 4#  3x10 4#  3x12 4#  3x15 5#  3x10 5#  3x10 5#  3x10 5#  3x10      SLR ab, ad, ext 4#  3x10 4#  3x12 4#  3x15 5#  3x10 5#  3x10 5#  3x10 5#  3x10      Prone TKEs 18#  3x15  claudy [place]  18#  3x15 20#  3x10 20#  3x15 20#  3x15 20 6#  3x15 21#  3x10      Standing knee flexion 4#  3x10 4#  3x12 4#  3x15 5#  3x10 5#  3x10 5#  3x10 5#  3x10      Wall slides 15#  3x15 20#  3x10 20#  3x10 20#  3x10 20#  3x12 20#  3x15 20#  3X15      SLS with alpha 2#  3X 2#  3X 2#  3X 2#  3X 2#  3x 2#  3X 2#  3x      Step downs 8"  3x10 8"  3x15 8"  3X15 8"  3x15 8"  3x15 8"  3x15 8"  3x15      Leg Press 120#  3x10 120#  3X12 120#  3X15 120#  3x15 125#  3x10 125#  3x10 125#  3x10      Quad str 3x30" 3x30" 3x30" 3x30" 3x30" 3x30" 3x30"      SLRDL 3x15 3x15 10#  3x10 10#  3x12 10#  3X15 15#  3x10 15#  3x10 SLS with BT 2#  3x15 2#  3x15 2#  3x15 2#  3x15 2#  3x15 2#  3x15 2#  To failure      Heel raises 3x15 3x15 3x15 3x15 3x15 3x15 3x15      Banded squats  purp  3x10 purp  3x12 purp  3x15 purp  3x15 purp  3x15 purp  3X15

## 2020-09-08 ENCOUNTER — OFFICE VISIT (OUTPATIENT)
Dept: PHYSICAL THERAPY | Facility: MEDICAL CENTER | Age: 40
End: 2020-09-08
Payer: COMMERCIAL

## 2020-09-08 DIAGNOSIS — S83.511D RUPTURE OF ANTERIOR CRUCIATE LIGAMENT OF RIGHT KNEE, SUBSEQUENT ENCOUNTER: ICD-10-CM

## 2020-09-08 DIAGNOSIS — M25.561 RIGHT KNEE PAIN, UNSPECIFIED CHRONICITY: ICD-10-CM

## 2020-09-08 DIAGNOSIS — Z98.890 S/P ACL RECONSTRUCTION: Primary | ICD-10-CM

## 2020-09-08 PROCEDURE — 97110 THERAPEUTIC EXERCISES: CPT | Performed by: PHYSICAL THERAPIST

## 2020-09-08 PROCEDURE — 97112 NEUROMUSCULAR REEDUCATION: CPT | Performed by: PHYSICAL THERAPIST

## 2020-09-08 NOTE — PROGRESS NOTES
Daily Note     Today's date: 2020  Patient name: David Al  : 1980  MRN: [id number]  Referring provider: Khai Matta  Dx:   Encounter Diagnosis     ICD-10-CM    1  S/P ACL reconstruction  Z98 890    2  Rupture of anterior cruciate ligament of right knee, subsequent encounter  S83 511D    3  Right knee pain, unspecified chronicity  M25 561           Subjective: Pt reports that she is doing well  She is eager to try to run  Objective: See treatment diary below    R knee flexion:     R hip abd:     + symmetry and fair depth with functional squat      Assessment: Tolerated treatment well  Patient demonstrated fatigue post treatment, exhibited good technique with therapeutic exercises and would benefit from continued PT  Pt with able to run on the TM for 5' with good symmetrical mechanics  Plan: Continue per plan of care        Precautions: s/p R ACL recon      Ther ex 8/13 8/17 8/20 8/24 8/27 8/31 9/3 9/8     Bike 10' 10' 10' 10' 10' Ellip  10' Bike  10' 10'     HS str 3x30" 3X30" 3x30" 3x30" 3x30" 3x30" 3x30" 3x30"     Gastroc str 3x30" 3x30" 3x30" 3x30" 3x30" 3x30" 3x30" 3x30"     Heel slides With SO  x30 x30 x30 x30 x30 x30 x30 x30     QS x 2 5"  x30 5"  x30 5"  x30 5"  x30 5"  x30 5"  x30 5"  x30 5"  x30     SLR flexion 4#  3x10 4#  3x12 4#  3x15 5#  3x10 5#  3x10 5#  3x10 5#  3x10 5#  3x10     SLR ab, ad, ext 4#  3x10 4#  3x12 4#  3x15 5#  3x10 5#  3x10 5#  3x10 5#  3x10 5#  3x10     Prone TKEs 18#  3x15  claudy [place]  18#  3x15 20#  3x10 20#  3x15 20#  3x15 20 6#  3x15 21#  3x10 25#  3x10     Standing knee flexion 4#  3x10 4#  3x12 4#  3x15 5#  3x10 5#  3x10 5#  3x10 5#  3x10 5#  3x10     Wall slides 15#  3x15 20#  3x10 20#  3x10 20#  3x10 20#  3x12 20#  3x15 20#  3X15 20#  3x15     SLS with alpha 2#  3X 2#  3X 2#  3X 2#  3X 2#  3x 2#  3X 2#  3x 2#  3X  AE     Step downs 8"  3x10 8"  3x15 8"  3X15 8"  3x15 8"  3x15 8"  3x15 8"  3x15 8"  3x15     Leg Press 120#  3x10 120#  3X12 120#  3X15 120#  3x15 125#  3x10 125#  3x10 125#  3x10 130#  3X10     Quad str 3x30" 3x30" 3x30" 3x30" 3x30" 3x30" 3x30" 3x30"     SLRDL 3x15 3x15 10#  3x10 10#  3x12 10#  3X15 15#  3x10 15#  3x10 15#  3x15     SLS with BT 2#  3x15 2#  3x15 2#  3x15 2#  3x15 2#  3x15 2#  3x15 2#  To failure 2#  To failure     Heel raises 3x15 3x15 3x15 3x15 3x15 3x15 3x15 3x15     Banded squats  purp  3x10 purp  3x12 purp  3x15 purp  3x15 purp  3x15 purp  3X15 purp  3x15     TM run        5'

## 2020-09-10 ENCOUNTER — OFFICE VISIT (OUTPATIENT)
Dept: PHYSICAL THERAPY | Facility: MEDICAL CENTER | Age: 40
End: 2020-09-10
Payer: COMMERCIAL

## 2020-09-10 DIAGNOSIS — M25.561 RIGHT KNEE PAIN, UNSPECIFIED CHRONICITY: ICD-10-CM

## 2020-09-10 DIAGNOSIS — S83.511D RUPTURE OF ANTERIOR CRUCIATE LIGAMENT OF RIGHT KNEE, SUBSEQUENT ENCOUNTER: ICD-10-CM

## 2020-09-10 DIAGNOSIS — Z98.890 S/P ACL RECONSTRUCTION: Primary | ICD-10-CM

## 2020-09-10 PROCEDURE — 97112 NEUROMUSCULAR REEDUCATION: CPT

## 2020-09-10 PROCEDURE — 97110 THERAPEUTIC EXERCISES: CPT

## 2020-09-10 NOTE — PROGRESS NOTES
Daily Note     Today's date: 9/10/2020  Patient name: Nichol Bagley  : 1980  MRN: [id number]  Referring provider: Chase Mcgee*  Dx:   Encounter Diagnosis     ICD-10-CM    1  S/P ACL reconstruction  Z98 890    2  Rupture of anterior cruciate ligament of right knee, subsequent encounter  S83 511D    3  Right knee pain, unspecified chronicity  M25 561                   Subjective: Pt reports that she went running for 5 min and is quite fatigued, but denies pain  Objective: See treatment diary below      Assessment: Tolerated treatment well  Patient demonstrated fatigue post treatment, exhibited good technique with therapeutic exercises and would benefit from continued PT  Pt progressing well but is anxious to return to premorbid function  Plan: Continue per plan of care        Precautions: s/p R ACL recon      Ther ex 8/13 8/17 8/20 8/24 8/27 8/31 9/3 9/8 9/10    Bike 10' 10' 10' 10' 10' Ellip  10' Bike  10' 10' 10'    HS str 3x30" 3X30" 3x30" 3x30" 3x30" 3x30" 3x30" 3x30" 3x30"    Gastroc str 3x30" 3x30" 3x30" 3x30" 3x30" 3x30" 3x30" 3x30" 3x30"    Heel slides With SO  x30 x30 x30 x30 x30 x30 x30 x30 x30    QS x 2 5"  x30 5"  x30 5"  x30 5"  x30 5"  x30 5"  x30 5"  x30 5"  x30 5"  x30    SLR flexion 4#  3x10 4#  3x12 4#  3x15 5#  3x10 5#  3x10 5#  3x10 5#  3x10 5#  3x10 5#  3x10    SLR ab, ad, ext 4#  3x10 4#  3x12 4#  3x15 5#  3x10 5#  3x10 5#  3x10 5#  3x10 5#  3x10 5#  3x10    Prone TKEs 18#  3x15  [place]  18#  3x15 20#  3x10 20#  3x15 20#  3x15 20 6#  3x15 21#  3x10 25#  3x10 25#  3x10    Standing knee flexion 4#  3x10 4#  3x12 4#  3x15 5#  3x10 5#  3x10 5#  3x10 5#  3x10 5#  3x10 5#  3x10    Wall slides 15#  3x15 20#  3x10 20#  3x10 20#  3x10 20#  3x12 20#  3x15 20#  3X15 20#  3x15 20#  3x15    SLS with alpha 2#  3X 2#  3X 2#  3X 2#  3X 2#  3x 2#  3X 2#  3x 2#  3X  AE 2#  3x  AE    Step downs 8"  3x10 8"  3x15 8"  3X15 8"  3x15 8"  3x15 8"  3x15 8"  3x15 8"  3x15 8"  3x15 Leg Press 120#  3x10 120#  3X12 120#  3X15 120#  3x15 125#  3x10 125#  3x10 125#  3x10 130#  3X10 130#  3x10    Quad str 3x30" 3x30" 3x30" 3x30" 3x30" 3x30" 3x30" 3x30" 3x30"    SLRDL 3x15 3x15 10#  3x10 10#  3x12 10#  3X15 15#  3x10 15#  3x10 15#  3x15 15#  3x15    SLS with BT 2#  3x15 2#  3x15 2#  3x15 2#  3x15 2#  3x15 2#  3x15 2#  To failure 2#  To failure 2#  To  failure    Heel raises 3x15 3x15 3x15 3x15 3x15 3x15 3x15 3x15 3x15    Banded squats  purp  3x10 purp  3x12 purp  3x15 purp  3x15 purp  3x15 purp  3X15 purp  3x15 Purp  3x15    TM run        5' home

## 2020-09-14 ENCOUNTER — OFFICE VISIT (OUTPATIENT)
Dept: PHYSICAL THERAPY | Facility: MEDICAL CENTER | Age: 40
End: 2020-09-14
Payer: COMMERCIAL

## 2020-09-14 DIAGNOSIS — M25.561 RIGHT KNEE PAIN, UNSPECIFIED CHRONICITY: ICD-10-CM

## 2020-09-14 DIAGNOSIS — Z98.890 S/P ACL RECONSTRUCTION: Primary | ICD-10-CM

## 2020-09-14 DIAGNOSIS — S83.511D RUPTURE OF ANTERIOR CRUCIATE LIGAMENT OF RIGHT KNEE, SUBSEQUENT ENCOUNTER: ICD-10-CM

## 2020-09-14 PROCEDURE — 97112 NEUROMUSCULAR REEDUCATION: CPT

## 2020-09-14 PROCEDURE — 97110 THERAPEUTIC EXERCISES: CPT

## 2020-09-14 NOTE — PROGRESS NOTES
Daily Note     Today's date: 2020  Patient name: Suzie Og  : 1980  MRN: [id number]  Referring provider: Daniel Tovar  Dx:   Encounter Diagnosis     ICD-10-CM    1  S/P ACL reconstruction  Z98 890    2  Rupture of anterior cruciate ligament of right knee, subsequent encounter  S83 511D    3  Right knee pain, unspecified chronicity  M25 561                   Subjective: Pt states that she's been riding her stationary bike at home and has increased her running to 6 min  Pt reports that she does get swelling after increased activity, but overall her knee is better  Objective: See treatment diary below      Assessment: Tolerated treatment well  Patient demonstrated fatigue post treatment, exhibited good technique with therapeutic exercises and would benefit from continued PT  Pt's mobility and strength cont to improve  Plan: Continue per plan of care        Precautions: s/p R ACL recon      Ther ex 8/13 8/17 8/20 8/24 8/27 8/31 9/3 9/8 9/10 9/14   Bike 10' 10' 10' 10' 10' Ellip  10' Bike  10' 10' 10' 10'   HS str 3x30" 3X30" 3x30" 3x30" 3x30" 3x30" 3x30" 3x30" 3x30" 3x30"   Gastroc str 3x30" 3x30" 3x30" 3x30" 3x30" 3x30" 3x30" 3x30" 3x30" 3x30"   Heel slides With SO  x30 x30 x30 x30 x30 x30 x30 x30 x30 x30   QS x 2 5"  x30 5"  x30 5"  x30 5"  x30 5"  x30 5"  x30 5"  x30 5"  x30 5"  x30 5"  x30   SLR flexion 4#  3x10 4#  3x12 4#  3x15 5#  3x10 5#  3x10 5#  3x10 5#  3x10 5#  3x10 5#  3x10 5#  3x12   SLR ab, ad, ext 4#  3x10 4#  3x12 4#  3x15 5#  3x10 5#  3x10 5#  3x10 5#  3x10 5#  3x10 5#  3x10 5#  3x12   Prone TKEs 18#  3x15  [place]  18#  3x15 20#  3x10 20#  3x15 20#  3x15 20 6#  3x15 21#  3x10 25#  3x10 25#  3x10 25#  3x10   Standing knee flexion 4#  3x10 4#  3x12 4#  3x15 5#  3x10 5#  3x10 5#  3x10 5#  3x10 5#  3x10 5#  3x10 5#  3x12   Wall slides 15#  3x15 20#  3x10 20#  3x10 20#  3x10 20#  3x12 20#  3x15 20#  3X15 20#  3x15 20#  3x15 20#  3x15   SLS with alpha 2#  3X 2#  3X 2#  3X 2#  3X 2#  3x 2#  3X 2#  3x 2#  3X  AE 2#  3x  AE 2#  3x  AE   Step downs 8"  3x10 8"  3x15 8"  3X15 8"  3x15 8"  3x15 8"  3x15 8"  3x15 8"  3x15 8"  3x15 8"  3x15   Leg Press 120#  3x10 120#  3X12 120#  3X15 120#  3x15 125#  3x10 125#  3x10 125#  3x10 130#  3X10 130#  3x10 140#  3x10   Quad str 3x30" 3x30" 3x30" 3x30" 3x30" 3x30" 3x30" 3x30" 3x30" 3x30"   SLRDL 3x15 3x15 10#  3x10 10#  3x12 10#  3X15 15#  3x10 15#  3x10 15#  3x15 15#  3x15 20#  3x15   SLS with BT 2#  3x15 2#  3x15 2#  3x15 2#  3x15 2#  3x15 2#  3x15 2#  To failure 2#  To failure 2#  To  failure 2#  To  Failure     Heel raises 3x15 3x15 3x15 3x15 3x15 3x15 3x15 3x15 3x15 3x15   Banded squats  purp  3x10 purp  3x12 purp  3x15 purp  3x15 purp  3x15 purp  3X15 purp  3x15 Purp  3x15 Purp  3x15   TM run        [facility] home Home

## 2020-09-17 ENCOUNTER — OFFICE VISIT (OUTPATIENT)
Dept: PHYSICAL THERAPY | Facility: MEDICAL CENTER | Age: 40
End: 2020-09-17
Payer: COMMERCIAL

## 2020-09-17 DIAGNOSIS — M25.561 RIGHT KNEE PAIN, UNSPECIFIED CHRONICITY: ICD-10-CM

## 2020-09-17 DIAGNOSIS — S83.511D RUPTURE OF ANTERIOR CRUCIATE LIGAMENT OF RIGHT KNEE, SUBSEQUENT ENCOUNTER: ICD-10-CM

## 2020-09-17 DIAGNOSIS — Z98.890 S/P ACL RECONSTRUCTION: Primary | ICD-10-CM

## 2020-09-17 PROCEDURE — 97112 NEUROMUSCULAR REEDUCATION: CPT

## 2020-09-17 PROCEDURE — 97110 THERAPEUTIC EXERCISES: CPT

## 2020-09-17 NOTE — PROGRESS NOTES
Daily Note     Today's date: 2020  Patient name: Memo Casarez  : 1980  MRN: [id number]  Referring provider: Adonis Ace*  Dx:   Encounter Diagnosis     ICD-10-CM    1  S/P ACL reconstruction  Z98 890    2  Rupture of anterior cruciate ligament of right knee, subsequent encounter  S83 511D    3  Right knee pain, unspecified chronicity  M25 561                   Subjective: Pt reports that her surgeon drained her knee yesterday and was advised to refrain from strenuous activities for the next 4-5 days  Objective: See treatment diary below      Assessment: Tolerated treatment well  Patient demonstrated fatigue post treatment, exhibited good technique with therapeutic exercises and would benefit from continued PT  No discomfort with ex's as noted  Plan: Continue per plan of care        Precautions: s/p R ACL recon      Ther ex             Bike 10'            HS str 3x30"            Gastroc str 3x30"            Heel slides x30            QS x 2 5"  x30            SLR flexion 5#  3x15            SLR ab, ad, ext 5#  3x15             TKEs Claudy 25#  3x12            Standing knee flexion 5#  3x15            Wall slides 20#  3x15            SLS with alpha 2#  3X  AE            Step downs 8"  3x15            Leg Press 140#  3x15            Quad str 3x30"            SLRDL 20#  3x15            SLS with BT 2#  To  failure            Heel raises 3x15            Banded squats Purp  3x15            TM run NP

## 2020-09-18 NOTE — PROGRESS NOTES
PT RE-Evaluation     Today's date: 2020  Patient name: Ruben Eagle  : 1980  MRN: [id number]  Referring provider: Salvatore Sierra*  Dx:   Encounter Diagnosis     ICD-10-CM    1  Rupture of anterior cruciate ligament of right knee, subsequent encounter S83 511D    2  Right knee pain, unspecified chronicity M25 561    3  S/P ACL reconstruction [id number]          Assessment  Assessment details: Pt is a pleasant 44 yo female presenting to physical therapy s/p R knee ACL recon with quad tendon autograph  Pt would benefit from continued skilled PT to address current impairments and return pt to pre-morbid function  Understanding of Dx/Px/POC: good   Prognosis: good    Goals  Impairment Goals  - Pt I with initial HEP in 1-2 visits   Met  - Improve ROM equal to contralateral side in 4-6 weeks  Met  - Increase strength to 5/5 in all affected areas in 4-6 weeks    Not met  - Eliminate swelling in 2-4 weeks   Met     Functional Goals  - Increase FOTO to at least 65 in 12+ weeks   Not met  - Patient will be independent with comprehensive HEP in 12+ weeks    Not met  - Ambulation is improved to prior level of function in 6-8 weeks    Met  - Stair climbing is improved to prior level of function in 6-8 weeks   met  - Squatting is improved to prior level of function in 8 weeks    Met  - Pt able to return to pre-morbid function except return to soccer in 12+ weeks   Not met    Plan  Patient would benefit from: skilled physical therapy  Other planned modality interventions: Modalities prn   Planned therapy interventions: balance, manual therapy, neuromuscular re-education, patient education, strengthening, stretching, therapeutic activities, therapeutic exercise and home exercise program  Frequency: 2x week  Duration in weeks: 8        Subjective Evaluation    History of Present Illness  Date of surgery: 2020  Mechanism of injury: Pt injured her R knee playing soccer  Subsequent MRI revealed a torn R ACL  Pt underwent an ACL recon 2020, using the quad tendon  Pt has been doing the HEP given to her by her surgeon  Update:     Pain  Current pain ratin  At best pain ratin  At worst pain ratin    Pt reports that she is progressing  She still gets swelling and stiffness with prolonged activity, but overall everything is improving  Social Support    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)  Exercise history: Soccer    Patient Goals  Patient goals for therapy: decreased edema, improved balance, decreased pain, increased motion, increased strength and return to sport/leisure activities          Objective     Neurological Testing     Sensation     Knee   Left Knee   Intact: light touch    Right Knee   Intact: light touch     Active Range of Motion   Left Knee   Hyperextension  Flexion: 155 degrees     Right Knee   Flexion: 155 degrees   Extension: 0 degrees     Mobility   Patellar Mobility:   Left Knee   WFL: medial, lateral, superior and inferior       Right Knee   WFL: medial, lateral, superior and inferior     Strength/Myotome Testing     Left Knee   Prone flexion: 5  Extension: 5  Quadriceps contraction: good    Right Knee   Prone flexion: 4+  Extension: 5  Quadriceps contraction: good    Hips:    Flexion: 4+  Extension: 4+  Abd: 4+  Add: 4+    General Comments:      Knee Comments  + R gastroc and HS tightness    - swelling R knee    Balance: mildly decreased R LE

## 2020-09-21 ENCOUNTER — OFFICE VISIT (OUTPATIENT)
Dept: PHYSICAL THERAPY | Facility: MEDICAL CENTER | Age: 40
End: 2020-09-21
Payer: COMMERCIAL

## 2020-09-21 DIAGNOSIS — S83.511D RUPTURE OF ANTERIOR CRUCIATE LIGAMENT OF RIGHT KNEE, SUBSEQUENT ENCOUNTER: ICD-10-CM

## 2020-09-21 DIAGNOSIS — Z98.890 S/P ACL RECONSTRUCTION: Primary | ICD-10-CM

## 2020-09-21 DIAGNOSIS — M25.561 RIGHT KNEE PAIN, UNSPECIFIED CHRONICITY: ICD-10-CM

## 2020-09-21 PROCEDURE — 97110 THERAPEUTIC EXERCISES: CPT | Performed by: PHYSICAL THERAPIST

## 2020-09-21 PROCEDURE — 97112 NEUROMUSCULAR REEDUCATION: CPT | Performed by: PHYSICAL THERAPIST

## 2020-09-21 NOTE — PROGRESS NOTES
Daily Note     Today's date: 2020  Patient name: Memo Casarez  : 1980  MRN: [id number]  Referring provider: Adonis Ace*  Dx:   Encounter Diagnosis     ICD-10-CM    1  S/P ACL reconstruction  Z98 890    2  Rupture of anterior cruciate ligament of right knee, subsequent encounter  S83 511D    3  Right knee pain, unspecified chronicity  M25 561             Subjective:  Pt is doing well today  She is not having issues with anything in particular  Objective: See treatment diary below      Assessment: Tolerated treatment well  Patient demonstrated fatigue post treatment, exhibited good technique with therapeutic exercises and would benefit from continued PT  Pt would benefit from continued strengthening  Plan: Continue per plan of care        Precautions: s/p R ACL recon      Ther ex            Bike 10' 10'           HS str 3x30" 3x30"           Gastroc str 3x30" 3x30"           Heel slides x30 x30           QS x 2 5"  x30 5"  x30           SLR flexion 5#  3x15 5#  3x15           SLR ab, ad, ext 5#  3x15 5#  3x15            TKEs Claudy 25#  3x12 28#  3x10           Standing knee flexion 5#  3x15 5#  3x15           Wall slides 20#  3x15 20#  3x15           SLS with alpha 2#  3X  AE 2#  AE  3X           Step downs 8"  3x15 8"  3x15           Leg Press 140#  3x15 150#  3x10           Quad str 3x30" 3x30"           SLRDL 20#  3x15 20#  3X15           SLS with BT 2#  To  failure 2#  AE  To failure           Heel raises 3x15 3x15           Banded squats Purp  3x15 3x15           TM run NP

## 2020-09-24 ENCOUNTER — OFFICE VISIT (OUTPATIENT)
Dept: PHYSICAL THERAPY | Facility: MEDICAL CENTER | Age: 40
End: 2020-09-24
Payer: COMMERCIAL

## 2020-09-24 DIAGNOSIS — M25.561 RIGHT KNEE PAIN, UNSPECIFIED CHRONICITY: ICD-10-CM

## 2020-09-24 DIAGNOSIS — Z98.890 S/P ACL RECONSTRUCTION: Primary | ICD-10-CM

## 2020-09-24 DIAGNOSIS — S83.511D RUPTURE OF ANTERIOR CRUCIATE LIGAMENT OF RIGHT KNEE, SUBSEQUENT ENCOUNTER: ICD-10-CM

## 2020-09-24 PROCEDURE — 97110 THERAPEUTIC EXERCISES: CPT

## 2020-09-24 PROCEDURE — 97112 NEUROMUSCULAR REEDUCATION: CPT

## 2020-09-24 NOTE — PROGRESS NOTES
Daily Note     Today's date: 2020  Patient name: Kasia Cooper  : 1980  MRN: [id number]  Referring provider: Inge Bar*  Dx:   Encounter Diagnosis     ICD-10-CM    1  S/P ACL reconstruction  Z98 890    2  Rupture of anterior cruciate ligament of right knee, subsequent encounter  S83 511D    3  Right knee pain, unspecified chronicity  M25 561                   Subjective: Pt reports that she's been modifying her running and biking time and has been feeling pretty good with this, but has c/o increased distal knee tightness today  Objective: See treatment diary below      Assessment: Tolerated treatment well  Patient demonstrated fatigue post treatment, exhibited good technique with therapeutic exercises and would benefit from continued PT  Pt notes decreased tightness post stretching today  Plan: Continue per plan of care        Precautions: s/p R ACL recon      Ther ex           Bike 10' 10' 10'          HS str 3x30" 3x30" 3x30"          Gastroc str 3x30" 3x30" 3x30"          Heel slides x30 x30 D/C          QS x 2 5"  x30 5"  x30 5"  x30          SLR flexion 5#  3x15 5#  3x15 5#  3x15          SLR ab, ad, ext 5#  3x15 5#  3x15 5#  3x15           TKEs Claudy 25#  3x12 28#  3x10 28#  3x10          Standing knee flexion 5#  3x15 5#  3x15 5#  3x15          Wall slides 20#  3x15 20#  3x15 20#  3x15          SLS with alpha 2#  3X  AE 2#  AE  3X 2#  AE  3x          Step downs 8"  3x15 8"  3x15 8"  3x15          Leg Press 140#  3x15 150#  3x10 150#  3x10          Quad str 3x30" 3x30" 3x30"          SLRDL 20#  3x15 20#  3X15 20#  3x15          SLS with BT 2#  To  failure 2#  AE  To failure 2#  AE  To  failure          Heel raises 3x15 3x15 3x15          Banded squats Purp  3x15 3x15 Blue  3x15

## 2020-09-28 ENCOUNTER — OFFICE VISIT (OUTPATIENT)
Dept: PHYSICAL THERAPY | Facility: MEDICAL CENTER | Age: 40
End: 2020-09-28
Payer: COMMERCIAL

## 2020-09-28 DIAGNOSIS — S83.511D RUPTURE OF ANTERIOR CRUCIATE LIGAMENT OF RIGHT KNEE, SUBSEQUENT ENCOUNTER: ICD-10-CM

## 2020-09-28 DIAGNOSIS — M25.561 RIGHT KNEE PAIN, UNSPECIFIED CHRONICITY: ICD-10-CM

## 2020-09-28 DIAGNOSIS — Z98.890 S/P ACL RECONSTRUCTION: Primary | ICD-10-CM

## 2020-09-28 PROCEDURE — 97112 NEUROMUSCULAR REEDUCATION: CPT

## 2020-09-28 PROCEDURE — 97110 THERAPEUTIC EXERCISES: CPT

## 2020-09-28 NOTE — PROGRESS NOTES
Daily Note     Today's date: 2020  Patient name: Shira Fleming  : 1980  MRN: [id number]  Referring provider: John Sanchez*  Dx:   Encounter Diagnosis     ICD-10-CM    1  S/P ACL reconstruction  Z98 890    2  Rupture of anterior cruciate ligament of right knee, subsequent encounter  S83 511D    3  Right knee pain, unspecified chronicity  M25 561                   Subjective: Pt reports that she's been doing stationary bike classes and experienced distal quad discomfort, but otherwise running outside is going well  Objective: See treatment diary below      Assessment: Tolerated treatment well  Patient demonstrated fatigue post treatment, exhibited good technique with therapeutic exercises and would benefit from continued PT      Plan: Continue per plan of care        Precautions: s/p R ACL recon      Ther ex          Bike 10' 10' 10' 10'         HS str 3x30" 3x30" 3x30" 3x30"         Gastroc str 3x30" 3x30" 3x30" 3x30"         Heel slides x30 x30 D/C          QS x 2 5"  x30 5"  x30 5"  x30 5"  x30         SLR flexion 5#  3x15 5#  3x15 5#  3x15 5#  3x15         SLR ab, ad, ext 5#  3x15 5#  3x15 5#  3x15 5#  3x15          TKEs Claudy 25#  3x12 28#  3x10 28#  3x10 28#  3x10         Standing knee flexion 5#  3x15 5#  3x15 5#  3x15 5#  3x15         Wall slides 20#  3x15 20#  3x15 20#  3x15 20#  3x15         SLS with alpha 2#  3X  AE 2#  AE  3X 2#  AE  3x 2#  AE  3x         Step downs 8"  3x15 8"  3x15 8"  3x15 8"  3x15         Leg Press 140#  3x15 150#  3x10 150#  3x10 150#  3x12         Quad str 3x30" 3x30" 3x30" 3x30"         SLRDL 20#  3x15 20#  3X15 20#  3x15 20#  3x10         SLS with BT 2#  To  failure 2#  AE  To failure 2#  AE  To  failure 2#  AE  To  failure         Heel raises 3x15 3x15 3x15 3x15         Banded squats Purp  3x15 3x15 Blue  3x15 Blue  3x15         High HS curls    NV

## 2020-10-01 ENCOUNTER — OFFICE VISIT (OUTPATIENT)
Dept: PHYSICAL THERAPY | Facility: MEDICAL CENTER | Age: 40
End: 2020-10-01
Payer: COMMERCIAL

## 2020-10-01 DIAGNOSIS — M25.561 RIGHT KNEE PAIN, UNSPECIFIED CHRONICITY: ICD-10-CM

## 2020-10-01 DIAGNOSIS — S83.511D RUPTURE OF ANTERIOR CRUCIATE LIGAMENT OF RIGHT KNEE, SUBSEQUENT ENCOUNTER: ICD-10-CM

## 2020-10-01 DIAGNOSIS — Z98.890 S/P ACL RECONSTRUCTION: Primary | ICD-10-CM

## 2020-10-01 PROCEDURE — 97112 NEUROMUSCULAR REEDUCATION: CPT | Performed by: PHYSICAL THERAPIST

## 2020-10-01 PROCEDURE — 97110 THERAPEUTIC EXERCISES: CPT | Performed by: PHYSICAL THERAPIST

## 2020-10-05 ENCOUNTER — OFFICE VISIT (OUTPATIENT)
Dept: PHYSICAL THERAPY | Facility: MEDICAL CENTER | Age: 40
End: 2020-10-05
Payer: COMMERCIAL

## 2020-10-05 DIAGNOSIS — M25.561 RIGHT KNEE PAIN, UNSPECIFIED CHRONICITY: ICD-10-CM

## 2020-10-05 DIAGNOSIS — Z98.890 S/P ACL RECONSTRUCTION: Primary | ICD-10-CM

## 2020-10-05 DIAGNOSIS — S83.511D RUPTURE OF ANTERIOR CRUCIATE LIGAMENT OF RIGHT KNEE, SUBSEQUENT ENCOUNTER: ICD-10-CM

## 2020-10-05 PROCEDURE — 97112 NEUROMUSCULAR REEDUCATION: CPT

## 2020-10-05 PROCEDURE — 97110 THERAPEUTIC EXERCISES: CPT

## 2020-10-12 ENCOUNTER — OFFICE VISIT (OUTPATIENT)
Dept: PHYSICAL THERAPY | Facility: MEDICAL CENTER | Age: 40
End: 2020-10-12
Payer: COMMERCIAL

## 2020-10-12 DIAGNOSIS — S83.511D RUPTURE OF ANTERIOR CRUCIATE LIGAMENT OF RIGHT KNEE, SUBSEQUENT ENCOUNTER: ICD-10-CM

## 2020-10-12 DIAGNOSIS — M25.561 RIGHT KNEE PAIN, UNSPECIFIED CHRONICITY: ICD-10-CM

## 2020-10-12 DIAGNOSIS — Z98.890 S/P ACL RECONSTRUCTION: Primary | ICD-10-CM

## 2020-10-12 PROCEDURE — 97112 NEUROMUSCULAR REEDUCATION: CPT

## 2020-10-12 PROCEDURE — 97110 THERAPEUTIC EXERCISES: CPT

## 2020-10-19 ENCOUNTER — OFFICE VISIT (OUTPATIENT)
Dept: PHYSICAL THERAPY | Facility: MEDICAL CENTER | Age: 40
End: 2020-10-19
Payer: COMMERCIAL

## 2020-10-19 DIAGNOSIS — S83.511D RUPTURE OF ANTERIOR CRUCIATE LIGAMENT OF RIGHT KNEE, SUBSEQUENT ENCOUNTER: ICD-10-CM

## 2020-10-19 DIAGNOSIS — Z98.890 S/P ACL RECONSTRUCTION: Primary | ICD-10-CM

## 2020-10-19 DIAGNOSIS — M25.561 RIGHT KNEE PAIN, UNSPECIFIED CHRONICITY: ICD-10-CM

## 2020-10-19 PROCEDURE — 97112 NEUROMUSCULAR REEDUCATION: CPT | Performed by: PHYSICAL THERAPIST

## 2020-10-19 PROCEDURE — 97110 THERAPEUTIC EXERCISES: CPT | Performed by: PHYSICAL THERAPIST

## 2020-10-26 ENCOUNTER — OFFICE VISIT (OUTPATIENT)
Dept: PHYSICAL THERAPY | Facility: MEDICAL CENTER | Age: 40
End: 2020-10-26
Payer: COMMERCIAL

## 2020-10-26 DIAGNOSIS — Z98.890 S/P ACL RECONSTRUCTION: Primary | ICD-10-CM

## 2020-10-26 DIAGNOSIS — M25.561 RIGHT KNEE PAIN, UNSPECIFIED CHRONICITY: ICD-10-CM

## 2020-10-26 DIAGNOSIS — S83.511D RUPTURE OF ANTERIOR CRUCIATE LIGAMENT OF RIGHT KNEE, SUBSEQUENT ENCOUNTER: ICD-10-CM

## 2020-10-26 PROCEDURE — 97112 NEUROMUSCULAR REEDUCATION: CPT

## 2020-10-26 PROCEDURE — 97110 THERAPEUTIC EXERCISES: CPT

## 2020-11-02 ENCOUNTER — OFFICE VISIT (OUTPATIENT)
Dept: PHYSICAL THERAPY | Facility: MEDICAL CENTER | Age: 40
End: 2020-11-02
Payer: COMMERCIAL

## 2020-11-02 DIAGNOSIS — M25.561 RIGHT KNEE PAIN, UNSPECIFIED CHRONICITY: ICD-10-CM

## 2020-11-02 DIAGNOSIS — Z98.890 S/P ACL RECONSTRUCTION: Primary | ICD-10-CM

## 2020-11-02 DIAGNOSIS — S83.511D RUPTURE OF ANTERIOR CRUCIATE LIGAMENT OF RIGHT KNEE, SUBSEQUENT ENCOUNTER: ICD-10-CM

## 2020-11-02 PROCEDURE — 97112 NEUROMUSCULAR REEDUCATION: CPT | Performed by: PHYSICAL THERAPIST

## 2020-11-02 PROCEDURE — 97110 THERAPEUTIC EXERCISES: CPT | Performed by: PHYSICAL THERAPIST

## 2020-11-12 ENCOUNTER — OFFICE VISIT (OUTPATIENT)
Dept: PHYSICAL THERAPY | Facility: MEDICAL CENTER | Age: 40
End: 2020-11-12
Payer: COMMERCIAL

## 2020-11-12 DIAGNOSIS — M25.561 RIGHT KNEE PAIN, UNSPECIFIED CHRONICITY: ICD-10-CM

## 2020-11-12 DIAGNOSIS — Z98.890 S/P ACL RECONSTRUCTION: Primary | ICD-10-CM

## 2020-11-12 DIAGNOSIS — S83.511D RUPTURE OF ANTERIOR CRUCIATE LIGAMENT OF RIGHT KNEE, SUBSEQUENT ENCOUNTER: ICD-10-CM

## 2020-11-12 PROCEDURE — 97112 NEUROMUSCULAR REEDUCATION: CPT

## 2020-11-12 PROCEDURE — 97110 THERAPEUTIC EXERCISES: CPT

## 2020-11-16 ENCOUNTER — OFFICE VISIT (OUTPATIENT)
Dept: PHYSICAL THERAPY | Facility: MEDICAL CENTER | Age: 40
End: 2020-11-16
Payer: COMMERCIAL

## 2020-11-16 DIAGNOSIS — M25.561 RIGHT KNEE PAIN, UNSPECIFIED CHRONICITY: ICD-10-CM

## 2020-11-16 DIAGNOSIS — Z98.890 S/P ACL RECONSTRUCTION: Primary | ICD-10-CM

## 2020-11-16 DIAGNOSIS — S83.511D RUPTURE OF ANTERIOR CRUCIATE LIGAMENT OF RIGHT KNEE, SUBSEQUENT ENCOUNTER: ICD-10-CM

## 2020-11-16 PROCEDURE — 97110 THERAPEUTIC EXERCISES: CPT

## 2020-11-16 PROCEDURE — 97112 NEUROMUSCULAR REEDUCATION: CPT

## 2020-11-23 ENCOUNTER — APPOINTMENT (OUTPATIENT)
Dept: PHYSICAL THERAPY | Facility: MEDICAL CENTER | Age: 40
End: 2020-11-23
Payer: COMMERCIAL

## 2020-11-25 ENCOUNTER — OFFICE VISIT (OUTPATIENT)
Dept: PHYSICAL THERAPY | Facility: MEDICAL CENTER | Age: 40
End: 2020-11-25
Payer: COMMERCIAL

## 2020-11-25 DIAGNOSIS — M25.561 RIGHT KNEE PAIN, UNSPECIFIED CHRONICITY: ICD-10-CM

## 2020-11-25 DIAGNOSIS — S83.511D RUPTURE OF ANTERIOR CRUCIATE LIGAMENT OF RIGHT KNEE, SUBSEQUENT ENCOUNTER: ICD-10-CM

## 2020-11-25 DIAGNOSIS — Z98.890 S/P ACL RECONSTRUCTION: Primary | ICD-10-CM

## 2020-11-25 PROCEDURE — 97112 NEUROMUSCULAR REEDUCATION: CPT | Performed by: PHYSICAL THERAPIST

## 2020-11-25 PROCEDURE — 97110 THERAPEUTIC EXERCISES: CPT | Performed by: PHYSICAL THERAPIST

## 2020-11-30 ENCOUNTER — APPOINTMENT (OUTPATIENT)
Dept: PHYSICAL THERAPY | Facility: MEDICAL CENTER | Age: 40
End: 2020-11-30
Payer: COMMERCIAL

## 2020-12-10 ENCOUNTER — OFFICE VISIT (OUTPATIENT)
Dept: PHYSICAL THERAPY | Facility: MEDICAL CENTER | Age: 40
End: 2020-12-10
Payer: COMMERCIAL

## 2020-12-10 DIAGNOSIS — S83.511D RUPTURE OF ANTERIOR CRUCIATE LIGAMENT OF RIGHT KNEE, SUBSEQUENT ENCOUNTER: ICD-10-CM

## 2020-12-10 DIAGNOSIS — Z98.890 S/P ACL RECONSTRUCTION: Primary | ICD-10-CM

## 2020-12-10 DIAGNOSIS — M25.561 RIGHT KNEE PAIN, UNSPECIFIED CHRONICITY: ICD-10-CM

## 2020-12-10 PROCEDURE — 97112 NEUROMUSCULAR REEDUCATION: CPT

## 2020-12-10 PROCEDURE — 97110 THERAPEUTIC EXERCISES: CPT

## 2021-03-26 ENCOUNTER — OFFICE VISIT (OUTPATIENT)
Dept: URGENT CARE | Facility: CLINIC | Age: 41
End: 2021-03-26
Payer: COMMERCIAL

## 2021-03-26 ENCOUNTER — APPOINTMENT (OUTPATIENT)
Dept: RADIOLOGY | Facility: CLINIC | Age: 41
End: 2021-03-26
Payer: COMMERCIAL

## 2021-03-26 VITALS
RESPIRATION RATE: 16 BRPM | SYSTOLIC BLOOD PRESSURE: 141 MMHG | TEMPERATURE: 97.9 F | DIASTOLIC BLOOD PRESSURE: 70 MMHG | OXYGEN SATURATION: 99 % | HEART RATE: 70 BPM

## 2021-03-26 DIAGNOSIS — S93.402A SPRAIN OF LEFT ANKLE, UNSPECIFIED LIGAMENT, INITIAL ENCOUNTER: Primary | ICD-10-CM

## 2021-03-26 DIAGNOSIS — S99.912A ANKLE INJURY, LEFT, INITIAL ENCOUNTER: ICD-10-CM

## 2021-03-26 PROCEDURE — 73610 X-RAY EXAM OF ANKLE: CPT

## 2021-03-26 PROCEDURE — 99213 OFFICE O/P EST LOW 20 MIN: CPT | Performed by: PHYSICIAN ASSISTANT

## 2021-03-26 NOTE — PATIENT INSTRUCTIONS
Ankle Sprain   AMBULATORY CARE:   An ankle sprain  happens when 1 or more ligaments in your ankle joint stretch or tear  Ligaments are tough tissues that connect bones  Ligaments support your joints and keep your bones in place  Common symptoms include the following:   · Trouble moving your ankle or foot    · Pain when you touch or put weight on your ankle    · Bruised, swollen, or misshapen ankle    Seek care immediately if:   · You have severe pain in your ankle  · Your foot or toes are cold or numb  · Your ankle becomes more weak or unstable (wobbly)  · You are unable to put any weight on your ankle or foot  · Your swelling has increased or returned  Call your doctor if:   · Your pain does not go away, even after treatment  · You have questions or concerns about your condition or care  Treatment:   · Medicines:      ? NSAIDs , such as ibuprofen, help decrease swelling, pain, and fever  This medicine is available with or without a doctor's order  NSAIDs can cause stomach bleeding or kidney problems in certain people  If you take blood thinner medicine, always ask your healthcare provider if NSAIDs are safe for you  Always read the medicine label and follow directions  ? Acetaminophen  decreases pain and fever  It is available without a doctor's order  Ask how much to take and how often to take it  Follow directions  Read the labels of all other medicines you are using to see if they also contain acetaminophen, or ask your doctor or pharmacist  Acetaminophen can cause liver damage if not taken correctly  Do not use more than 4 grams (4,000 milligrams) total of acetaminophen in one day  ? Prescription pain medicine  may be given  Ask your healthcare provider how to take this medicine safely  Some prescription pain medicines contain acetaminophen  Do not take other medicines that contain acetaminophen without talking to your healthcare provider   Too much acetaminophen may cause liver damage  Prescription pain medicine may cause constipation  Ask your healthcare provider how to prevent or treat constipation  · Surgery  may be needed to repair or replace a torn ligament if your sprain does not heal with other treatments  Your healthcare provider may use screws to attach the bones in your ankle together  The screws may help support your ankle and make it stable  Ask your healthcare provider for more information about surgery to treat your ankle sprain  Self-care:   · Use support devices,  such as a brace, cast, or splint, to limit your movement and protect your joint  You may need to use crutches to decrease your pain as you move around  · Go to physical therapy as directed  A physical therapist teaches you exercises to help improve movement and strength, and to decrease pain  · Rest  your ankle so that it can heal  Return to normal activities as directed  · Apply ice  on your ankle for 15 to 20 minutes every hour or as directed  Use an ice pack, or put crushed ice in a plastic bag  Cover it with a towel  Ice helps prevent tissue damage and decreases swelling and pain  · Compress  your ankle  Ask if you should wrap an elastic bandage around your injured ligament  An elastic bandage provides support and helps decrease swelling and movement so your joint can heal  Wear as long as directed  · Elevate  your ankle above the level of your heart as often as you can  This will help decrease swelling and pain  Prop your ankle on pillows or blankets to keep it elevated comfortably  Prevent another ankle sprain:   · Let your ankle heal   Find out how long your ligament needs to heal  Do not do any physical activity until your healthcare provider says it is okay  If you start activity too soon, you may develop a more serious injury  · Always warm up and stretch  before you exercise or play sports  · Use the right equipment    Always wear shoes that fit well and are made for the activity that you are doing  You may also need ankle supports, elbow and knee pads, or braces  Follow up with your doctor as directed:  Write down your questions so you remember to ask them during your visits  © Copyright [address] Information is for End User's use only and may not be sold, redistributed or otherwise used for commercial purposes  All illustrations and images included in CareNotes® are the copyrighted property of [employer] D [employer]  or [facility] [place]   The above information is an  only  It is not intended as medical advice for individual conditions or treatments  Talk to your doctor, nurse or pharmacist before following any medical regimen to see if it is safe and effective for you

## 2021-03-26 NOTE — PROGRESS NOTES
[facility]    NAME: Diana Headley is a 45 y o  female  : 1980    MRN: [id number]  DATE: 2021  TIME: 4:08 PM    Assessment and Plan   Sprain of left ankle, unspecified ligament, initial encounter [S93 [id number]]  1  Sprain of left ankle, unspecified ligament, initial encounter  XR ankle 3+ vw left     X-rays were reviewed with patient  Final report by radiologist on hand  No acute bony changes  Soft tissue swelling noted  Patient Instructions   Patient Instructions     Ankle Sprain   AMBULATORY CARE:   An ankle sprain  happens when 1 or more ligaments in your ankle joint stretch or tear  Ligaments are tough tissues that connect bones  Ligaments support your joints and keep your bones in place  Common symptoms include the following:   · Trouble moving your ankle or foot    · Pain when you touch or put weight on your ankle    · Bruised, swollen, or misshapen ankle    Seek care immediately if:   · You have severe pain in your ankle  · Your foot or toes are cold or numb  · Your ankle becomes more weak or unstable (wobbly)  · You are unable to put any weight on your ankle or foot  · Your swelling has increased or returned  Call your doctor if:   · Your pain does not go away, even after treatment  · You have questions or concerns about your condition or care  Treatment:   · Medicines:      ? NSAIDs , such as ibuprofen, help decrease swelling, pain, and fever  This medicine is available with or without a doctor's order  NSAIDs can cause stomach bleeding or kidney problems in certain people  If you take blood thinner medicine, always ask your healthcare provider if NSAIDs are safe for you  Always read the medicine label and follow directions  ? Acetaminophen  decreases pain and fever  It is available without a doctor's order  Ask how much to take and how often to take it  Follow directions   Read the labels of all other medicines you are using to see if they also contain acetaminophen, or ask your doctor or pharmacist  Acetaminophen can cause liver damage if not taken correctly  Do not use more than 4 grams (4,000 milligrams) total of acetaminophen in one day  ? Prescription pain medicine  may be given  Ask your healthcare provider how to take this medicine safely  Some prescription pain medicines contain acetaminophen  Do not take other medicines that contain acetaminophen without talking to your healthcare provider  Too much acetaminophen may cause liver damage  Prescription pain medicine may cause constipation  Ask your healthcare provider how to prevent or treat constipation  · Surgery  may be needed to repair or replace a torn ligament if your sprain does not heal with other treatments  Your healthcare provider may use screws to attach the bones in your ankle together  The screws may help support your ankle and make it stable  Ask your healthcare provider for more information about surgery to treat your ankle sprain  Self-care:   · Use support devices,  such as a brace, cast, or splint, to limit your movement and protect your joint  You may need to use crutches to decrease your pain as you move around  · Go to physical therapy as directed  A physical therapist teaches you exercises to help improve movement and strength, and to decrease pain  · Rest  your ankle so that it can heal  Return to normal activities as directed  · Apply ice  on your ankle for 15 to 20 minutes every hour or as directed  Use an ice pack, or put crushed ice in a plastic bag  Cover it with a towel  Ice helps prevent tissue damage and decreases swelling and pain  · Compress  your ankle  Ask if you should wrap an elastic bandage around your injured ligament  An elastic bandage provides support and helps decrease swelling and movement so your joint can heal  Wear as long as directed  · Elevate  your ankle above the level of your heart as often as you can   This will help decrease swelling and pain  Prop your ankle on pillows or blankets to keep it elevated comfortably  Prevent another ankle sprain:   · Let your ankle heal   Find out how long your ligament needs to heal  Do not do any physical activity until your healthcare provider says it is okay  If you start activity too soon, you may develop a more serious injury  · Always warm up and stretch  before you exercise or play sports  · Use the right equipment  Always wear shoes that fit well and are made for the activity that you are doing  You may also need ankle supports, elbow and knee pads, or braces  Follow up with your doctor as directed:  Write down your questions so you remember to ask them during your visits  © Copyright [address] Information is for End User's use only and may not be sold, redistributed or otherwise used for commercial purposes  All illustrations and images included in CareNotes® are the copyrighted property of A [employer] Inc  or [employer]   The above information is an  only  It is not intended as medical advice for individual conditions or treatments  Talk to your doctor, nurse or pharmacist before following any medical regimen to see if it is safe and effective for you  Chief Complaint     Chief Complaint   Patient presents with    Ankle Injury     Hurt the left ankle on Tuesday night at soccer performing a drill       History of Present Illness   Eduin Ya presents to the clinic c/o    43-year-old female twisted left ankle on Tuesday when she was doing a soccer drill  Grass was kind of bumpy and uneven and she inverted the ankle hearing and feeling a pop  She did not fall and was able to walk on it although painful  Definite pain, swelling, bruising  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  Comes in because her family continues to neck get her to get further evaluation       in 2020 she had injury to ACL and underwent repair and much physical therapy  She denies any history of injury to her left foot or ankle in the past     Ankle Pain   The incident occurred 3 to 5 days ago  [place] park  The injury mechanism was an inversion injury  The pain is present in the left ankle  The quality of the pain is described as aching  The pain is moderate  The pain has been improving since onset  Associated symptoms include a loss of motion  Pertinent negatives include no inability to bear weight, loss of sensation, muscle weakness, numbness or tingling  The symptoms are aggravated by weight bearing  She has tried ice, elevation and rest for the symptoms  The treatment provided mild relief  Review of Systems   Review of Systems   Constitutional: Positive for activity change  Negative for appetite change, chills and fever  Musculoskeletal: Positive for gait problem and joint swelling  Skin: Positive for color change  Neurological: Negative for tingling and numbness  Current Medications     Long-Term Medications   Medication Sig Dispense Refill    venlafaxine (EFFEXOR-XR) 75 mg 24 hr capsule Take 75 mg by mouth daily  0       Current Allergies     Allergies as of 03/26/2021 - Reviewed 03/26/2021   Allergen Reaction Noted    Demerol [meperidine]  06/30/2019    Banana  06/30/2019    Cefoxitin  06/30/2019    Latex  06/30/2019    Oxycodone  06/30/2019          The following portions of the patient's history were reviewed and updated as appropriate: allergies, current medications, past family history, past medical history, past social history, past surgical history and problem list   History reviewed  No pertinent past medical history  History reviewed  No pertinent surgical history    Family History   Problem Relation Age of Onset    No Known Problems Mother     No Known Problems Father     No Known Problems Sister     Breast cancer Maternal Grandmother     Cancer Maternal Grandfather     No Known Problems Paternal Grandmother     Cancer Paternal Grandfather     Breast cancer Maternal Aunt     Cancer Maternal Aunt     No Known Problems Paternal Aunt        Objective   /70   Pulse 70   Temp 97 9 °F (36 6 °C) (Tympanic)   Resp 16   LMP 03/13/2021   SpO2 99%   Patient's last menstrual period was 03/13/2021  Physical Exam     Physical Exam  Vitals signs and nursing note reviewed  Constitutional:       General: She is not in acute distress  Appearance: Normal appearance  She is well-developed  She is not ill-appearing, toxic-appearing or diaphoretic  Comments: Gait is mildly antalgic  Cardiovascular:      Rate and Rhythm: Normal rate  Pulmonary:      Effort: Pulmonary effort is normal  No respiratory distress  Musculoskeletal:         General: Swelling, tenderness and signs of injury present  Left ankle: She exhibits decreased range of motion, swelling and ecchymosis  She exhibits no deformity  Tenderness  Lateral malleolus and posterior TFL tenderness found  No medial malleolus, no AITFL, no CF ligament, no head of 5th metatarsal and no proximal fibula tenderness found  Achilles tendon normal  Achilles tendon exhibits no pain, no defect and normal Ness's test results  Comments: Negative anterior drawer L  Ankle  Skin:     Findings: Bruising present  Comments: Bruising lateral left ankle and foot greater than medial aspect  Neurological:      Mental Status: She is alert and oriented to person, place, and time     Psychiatric:         Mood and Affect: Mood normal          Behavior: Behavior normal

## 2022-09-13 ENCOUNTER — HOSPITAL ENCOUNTER (OUTPATIENT)
Dept: MAMMOGRAPHY | Facility: MEDICAL CENTER | Age: 42
Discharge: HOME | End: 2022-09-13
Payer: COMMERCIAL

## 2022-09-13 VITALS — HEIGHT: 65 IN | WEIGHT: 151.01 LBS | BODY MASS INDEX: 25.16 KG/M2

## 2022-09-13 DIAGNOSIS — Z12.31 ENCOUNTER FOR SCREENING MAMMOGRAM FOR MALIGNANT NEOPLASM OF BREAST: ICD-10-CM

## 2022-09-13 PROCEDURE — 77067 SCR MAMMO BI INCL CAD: CPT

## 2022-09-13 PROCEDURE — 77063 BREAST TOMOSYNTHESIS BI: CPT

## 2023-06-05 ENCOUNTER — OFFICE VISIT (OUTPATIENT)
Dept: FAMILY MEDICINE CLINIC | Facility: CLINIC | Age: 43
End: 2023-06-05
Payer: COMMERCIAL

## 2023-06-05 VITALS
OXYGEN SATURATION: 99 % | RESPIRATION RATE: 16 BRPM | SYSTOLIC BLOOD PRESSURE: 110 MMHG | HEIGHT: 66 IN | BODY MASS INDEX: 25.55 KG/M2 | TEMPERATURE: 98.1 F | DIASTOLIC BLOOD PRESSURE: 74 MMHG | HEART RATE: 84 BPM | WEIGHT: 159 LBS

## 2023-06-05 DIAGNOSIS — L65.9 HAIR THINNING: ICD-10-CM

## 2023-06-05 DIAGNOSIS — N92.6 IRREGULAR MENSES: ICD-10-CM

## 2023-06-05 DIAGNOSIS — Z13.1 SCREENING FOR DIABETES MELLITUS: ICD-10-CM

## 2023-06-05 DIAGNOSIS — F41.8 DEPRESSION WITH ANXIETY: ICD-10-CM

## 2023-06-05 DIAGNOSIS — R53.83 OTHER FATIGUE: Primary | ICD-10-CM

## 2023-06-05 DIAGNOSIS — Z13.0 SCREENING, ANEMIA, DEFICIENCY, IRON: ICD-10-CM

## 2023-06-05 DIAGNOSIS — Z11.4 SCREENING FOR HIV (HUMAN IMMUNODEFICIENCY VIRUS): ICD-10-CM

## 2023-06-05 DIAGNOSIS — Z11.59 NEED FOR HEPATITIS C SCREENING TEST: ICD-10-CM

## 2023-06-05 DIAGNOSIS — Z13.220 SCREENING, LIPID: ICD-10-CM

## 2023-06-05 DIAGNOSIS — Z13.29 SCREENING FOR THYROID DISORDER: ICD-10-CM

## 2023-06-05 DIAGNOSIS — E55.9 VITAMIN D DEFICIENCY: ICD-10-CM

## 2023-06-05 PROCEDURE — 99203 OFFICE O/P NEW LOW 30 MIN: CPT | Performed by: NURSE PRACTITIONER

## 2023-06-06 ENCOUNTER — APPOINTMENT (OUTPATIENT)
Dept: LAB | Facility: CLINIC | Age: 43
End: 2023-06-06
Payer: COMMERCIAL

## 2023-06-06 DIAGNOSIS — Z13.29 SCREENING FOR THYROID DISORDER: ICD-10-CM

## 2023-06-06 DIAGNOSIS — Z11.59 NEED FOR HEPATITIS C SCREENING TEST: ICD-10-CM

## 2023-06-06 DIAGNOSIS — L65.9 HAIR THINNING: ICD-10-CM

## 2023-06-06 DIAGNOSIS — R53.83 OTHER FATIGUE: ICD-10-CM

## 2023-06-06 DIAGNOSIS — N92.6 IRREGULAR MENSES: ICD-10-CM

## 2023-06-06 DIAGNOSIS — E55.9 VITAMIN D DEFICIENCY: ICD-10-CM

## 2023-06-06 DIAGNOSIS — Z13.1 SCREENING FOR DIABETES MELLITUS: ICD-10-CM

## 2023-06-06 DIAGNOSIS — Z13.220 SCREENING, LIPID: ICD-10-CM

## 2023-06-06 DIAGNOSIS — Z11.4 SCREENING FOR HIV (HUMAN IMMUNODEFICIENCY VIRUS): ICD-10-CM

## 2023-06-06 DIAGNOSIS — Z13.0 SCREENING, ANEMIA, DEFICIENCY, IRON: ICD-10-CM

## 2023-06-06 LAB
25(OH)D3 SERPL-MCNC: 43.4 NG/ML (ref 30–100)
ALBUMIN SERPL BCP-MCNC: 3.7 G/DL (ref 3.5–5)
ALP SERPL-CCNC: 56 U/L (ref 46–116)
ALT SERPL W P-5'-P-CCNC: 17 U/L (ref 12–78)
ANION GAP SERPL CALCULATED.3IONS-SCNC: 0 MMOL/L (ref 4–13)
AST SERPL W P-5'-P-CCNC: 15 U/L (ref 5–45)
BASOPHILS # BLD AUTO: 0.05 THOUSANDS/ÂΜL (ref 0–0.1)
BASOPHILS NFR BLD AUTO: 1 % (ref 0–1)
BILIRUB SERPL-MCNC: 0.55 MG/DL (ref 0.2–1)
BUN SERPL-MCNC: 8 MG/DL (ref 5–25)
CALCIUM SERPL-MCNC: 8.7 MG/DL (ref 8.3–10.1)
CHLORIDE SERPL-SCNC: 107 MMOL/L (ref 96–108)
CHOLEST SERPL-MCNC: 205 MG/DL
CO2 SERPL-SCNC: 29 MMOL/L (ref 21–32)
CREAT SERPL-MCNC: 0.71 MG/DL (ref 0.6–1.3)
EOSINOPHIL # BLD AUTO: 0.16 THOUSAND/ÂΜL (ref 0–0.61)
EOSINOPHIL NFR BLD AUTO: 4 % (ref 0–6)
ERYTHROCYTE [DISTWIDTH] IN BLOOD BY AUTOMATED COUNT: 12.7 % (ref 11.6–15.1)
FERRITIN SERPL-MCNC: 6 NG/ML (ref 11–307)
GFR SERPL CREATININE-BSD FRML MDRD: 105 ML/MIN/1.73SQ M
GLUCOSE P FAST SERPL-MCNC: 89 MG/DL (ref 65–99)
HCT VFR BLD AUTO: 39.5 % (ref 34.8–46.1)
HCV AB SER QL: NORMAL
HDLC SERPL-MCNC: 93 MG/DL
HGB BLD-MCNC: 13.5 G/DL (ref 11.5–15.4)
HIV 1+2 AB+HIV1 P24 AG SERPL QL IA: NORMAL
HIV 2 AB SERPL QL IA: NORMAL
HIV1 AB SERPL QL IA: NORMAL
HIV1 P24 AG SERPL QL IA: NORMAL
IMM GRANULOCYTES # BLD AUTO: 0.01 THOUSAND/UL (ref 0–0.2)
IMM GRANULOCYTES NFR BLD AUTO: 0 % (ref 0–2)
IRON SATN MFR SERPL: 32 % (ref 15–50)
IRON SERPL-MCNC: 118 UG/DL (ref 50–170)
LDLC SERPL CALC-MCNC: 95 MG/DL (ref 0–100)
LYMPHOCYTES # BLD AUTO: 1.67 THOUSANDS/ÂΜL (ref 0.6–4.47)
LYMPHOCYTES NFR BLD AUTO: 40 % (ref 14–44)
MCH RBC QN AUTO: 31.9 PG (ref 26.8–34.3)
MCHC RBC AUTO-ENTMCNC: 34.2 G/DL (ref 31.4–37.4)
MCV RBC AUTO: 93 FL (ref 82–98)
MONOCYTES # BLD AUTO: 0.42 THOUSAND/ÂΜL (ref 0.17–1.22)
MONOCYTES NFR BLD AUTO: 10 % (ref 4–12)
NEUTROPHILS # BLD AUTO: 1.85 THOUSANDS/ÂΜL (ref 1.85–7.62)
NEUTS SEG NFR BLD AUTO: 45 % (ref 43–75)
NONHDLC SERPL-MCNC: 112 MG/DL
NRBC BLD AUTO-RTO: 0 /100 WBCS
PLATELET # BLD AUTO: 356 THOUSANDS/UL (ref 149–390)
PMV BLD AUTO: 10.5 FL (ref 8.9–12.7)
POTASSIUM SERPL-SCNC: 3.9 MMOL/L (ref 3.5–5.3)
PROT SERPL-MCNC: 7.4 G/DL (ref 6.4–8.4)
RBC # BLD AUTO: 4.23 MILLION/UL (ref 3.81–5.12)
SODIUM SERPL-SCNC: 136 MMOL/L (ref 135–147)
T4 FREE SERPL-MCNC: 0.67 NG/DL (ref 0.61–1.12)
TIBC SERPL-MCNC: 370 UG/DL (ref 250–450)
TRIGL SERPL-MCNC: 85 MG/DL
TSH SERPL DL<=0.05 MIU/L-ACNC: 0.93 UIU/ML (ref 0.45–4.5)
WBC # BLD AUTO: 4.16 THOUSAND/UL (ref 4.31–10.16)

## 2023-06-06 PROCEDURE — 82306 VITAMIN D 25 HYDROXY: CPT

## 2023-06-06 PROCEDURE — 83550 IRON BINDING TEST: CPT

## 2023-06-06 PROCEDURE — 82728 ASSAY OF FERRITIN: CPT

## 2023-06-06 PROCEDURE — 80053 COMPREHEN METABOLIC PANEL: CPT

## 2023-06-06 PROCEDURE — 80061 LIPID PANEL: CPT

## 2023-06-06 PROCEDURE — 36415 COLL VENOUS BLD VENIPUNCTURE: CPT

## 2023-06-06 PROCEDURE — 86800 THYROGLOBULIN ANTIBODY: CPT

## 2023-06-06 PROCEDURE — 86376 MICROSOMAL ANTIBODY EACH: CPT

## 2023-06-06 PROCEDURE — 84439 ASSAY OF FREE THYROXINE: CPT

## 2023-06-06 PROCEDURE — 85025 COMPLETE CBC W/AUTO DIFF WBC: CPT

## 2023-06-06 PROCEDURE — 87389 HIV-1 AG W/HIV-1&-2 AB AG IA: CPT

## 2023-06-06 PROCEDURE — 86803 HEPATITIS C AB TEST: CPT

## 2023-06-06 PROCEDURE — 83540 ASSAY OF IRON: CPT

## 2023-06-06 PROCEDURE — 84443 ASSAY THYROID STIM HORMONE: CPT

## 2023-06-07 LAB
THYROGLOB AB SERPL-ACNC: <1 IU/ML (ref 0–0.9)
THYROPEROXIDASE AB SERPL-ACNC: 43 IU/ML (ref 0–34)

## 2023-06-12 PROBLEM — F41.8 DEPRESSION WITH ANXIETY: Status: ACTIVE | Noted: 2023-06-12

## 2023-06-12 NOTE — PROGRESS NOTES
[facility]    ASSESSMENT AND PLAN     1  Other fatigue  Assessment & Plan:  Suspect multi factorial   Check labs:    CBC, TSH, Iron, Vitamin D (past deficiency)  Encourage good self care; good sleep hygiene  F/U once labs completed to review   Due for annual physical    Orders:  -     Comprehensive metabolic panel; Future  -     T4, free; Future  -     Thyroid Antibodies Panel; Future  -     TSH, 3rd generation; Future  -     Iron Panel (Includes Ferritin, Iron Sat%, Iron, and TIBC); Future  -     Vitamin D 25 hydroxy; Future    2  Irregular menses  -     CBC and differential; Future  -     T4, free; Future  -     Thyroid Antibodies Panel; Future  -     TSH, 3rd generation; Future  -     Iron Panel (Includes Ferritin, Iron Sat%, Iron, and TIBC); Future    3  Depression with anxiety  Assessment & Plan:  Started on Venlafaxine 75 by Gyn  For depression  On several years without issue  Feels well  No depression  No anxiety  Will take over management of this medication  4  Screening for thyroid disorder  -     T4, free; Future  -     Thyroid Antibodies Panel; Future  -     TSH, 3rd generation; Future    5  Screening, lipid  -     Lipid panel; Future    6  Screening for diabetes mellitus  -     Comprehensive metabolic panel; Future    7  Screening, anemia, deficiency, iron  -     CBC and differential; Future  -     Iron Panel (Includes Ferritin, Iron Sat%, Iron, and TIBC); Future    8  Vitamin D deficiency  -     Vitamin D 25 hydroxy; Future    9  Need for hepatitis C screening test  -     Hepatitis C antibody; Future    10  Screening for HIV (human immunodeficiency virus)  -     : HIV 1/2 AB/AG w Reflex [facility] for 2 yr old and above; Future    11  Hair thinning  -     T4, free; Future  -     Thyroid Antibodies Panel; Future  -     TSH, 3rd generation; Future  -     Iron Panel (Includes Ferritin, Iron Sat%, Iron, and TIBC); Future         SUBJECTIVE       Patient ID: Pablo Parada is a 44 y o  "female  No chief complaint on file  HISTORY OF PRESENT ILLNESS    Present to establish primary care in our office  Transition from [facility]  Over due for preventative  Reports fatigue lasts several months  Reports her  reports a \"bald\" spot on the back of her head  The following portions of the patient's history were reviewed and updated as appropriate: allergies, current medications, past family history, past medical history, past social history, past surgical history, and problem list     REVIEW OF SYSTEMS  Review of Systems   Constitutional: Positive for fatigue  Negative for activity change and appetite change  Unexpected weight change: gain - unable to loose  Skin:        Hair thinning at spots  First noted by her hairdresser  No masses or lumps   Psychiatric/Behavioral: Negative  Negative for self-injury, sleep disturbance and suicidal ideas  The patient is not nervous/anxious  OBJECTIVE      VITAL SIGNS  /74 (BP Location: Right arm, Patient Position: Sitting, Cuff Size: Standard)   Pulse 84   Temp 98 1 °F (36 7 °C) (Temporal)   Resp 16   Ht 5' 6\" (1 676 m)   Wt 72 1 kg (159 lb)   LMP 06/03/2023 (Exact Date)   SpO2 99%   BMI 25 66 kg/m²     CURRENT MEDICATIONS    Current Outpatient Medications:   •  ferrous sulfate 324 (65 Fe) mg, Take 1 tablet (324 mg total) by mouth 2 (two) times a day before meals, Disp: 180 tablet, Rfl: 1  •  ibuprofen (MOTRIN) 600 mg tablet, Take 1 tablet (600 mg total) by mouth every 6 (six) hours as needed for mild pain or moderate pain, Disp: 90 tablet, Rfl: 0  •  venlafaxine (EFFEXOR-XR) 75 mg 24 hr capsule, Take 75 mg by mouth daily, Disp: , Rfl: 0      PHYSICAL EXAMINATION   Physical Exam  Vitals and nursing note reviewed  Constitutional:       General: She is not in acute distress  Appearance: Normal appearance  She is not ill-appearing  HENT:      Head: Normocephalic        Comments: Hair appears thick and evenly " distributed  Pulmonary:      Effort: Pulmonary effort is normal  No respiratory distress  Skin:     General: Skin is warm and dry  Neurological:      Mental Status: She is alert and oriented to person, place, and time  Psychiatric:         Attention and Perception: Attention normal          Mood and Affect: Mood normal          Behavior: Behavior normal          Thought Content:  Thought content normal

## 2023-06-12 NOTE — ASSESSMENT & PLAN NOTE
Started on Venlafaxine 75 by Gyn  For depression  On several years without issue  Feels well  No depression  No anxiety  Will take over management of this medication

## 2023-06-13 ENCOUNTER — OFFICE VISIT (OUTPATIENT)
Dept: FAMILY MEDICINE CLINIC | Facility: CLINIC | Age: 43
End: 2023-06-13
Payer: COMMERCIAL

## 2023-06-13 VITALS
HEART RATE: 63 BPM | HEIGHT: 66 IN | WEIGHT: 158 LBS | SYSTOLIC BLOOD PRESSURE: 110 MMHG | BODY MASS INDEX: 25.39 KG/M2 | TEMPERATURE: 97.7 F | RESPIRATION RATE: 16 BRPM | DIASTOLIC BLOOD PRESSURE: 70 MMHG | OXYGEN SATURATION: 97 %

## 2023-06-13 DIAGNOSIS — E61.1 IRON DEFICIENCY: ICD-10-CM

## 2023-06-13 DIAGNOSIS — R76.8 THYROID ANTIBODY POSITIVE: ICD-10-CM

## 2023-06-13 DIAGNOSIS — M54.30 SCIATIC LEG PAIN: ICD-10-CM

## 2023-06-13 DIAGNOSIS — Q67.6 PECTUS EXCAVATUM: ICD-10-CM

## 2023-06-13 DIAGNOSIS — Z00.00 ANNUAL PHYSICAL EXAM: Primary | ICD-10-CM

## 2023-06-13 DIAGNOSIS — N94.6 CRAMPY PAIN ASSOCIATED WITH MENSES: ICD-10-CM

## 2023-06-13 PROCEDURE — 99396 PREV VISIT EST AGE 40-64: CPT | Performed by: NURSE PRACTITIONER

## 2023-06-13 RX ORDER — IBUPROFEN 600 MG/1
600 TABLET ORAL EVERY 6 HOURS PRN
Qty: 90 TABLET | Refills: 0 | Status: SHIPPED | OUTPATIENT
Start: 2023-06-13

## 2023-06-13 RX ORDER — FERROUS SULFATE TAB EC 324 MG (65 MG FE EQUIVALENT) 324 (65 FE) MG
324 TABLET DELAYED RESPONSE ORAL
Qty: 180 TABLET | Refills: 1 | Status: SHIPPED | OUTPATIENT
Start: 2023-06-13

## 2023-06-13 NOTE — PROGRESS NOTES
[facility]ULT [facility]NUAL [address]  [facility]    NAME: Evgeny Myers  AGE: 44 y o  SEX: female  : 1980     DATE: 2023     Assessment and Plan:   Comprehensive physical exam  PMH and chronic conditions reviewed  Medications reconciled  Preventative care and recommended screenings as below  Following with Vascular - endovenous ablation; excision  Continue annual physicals  Follow-up sooner as needed  Problem List Items Addressed This Visit    None  Visit Diagnoses     Annual physical exam    -  Primary    Iron deficiency        Relevant Medications    ferrous sulfate 324 (65 Fe) mg    Other Relevant Orders    Iron Panel (Includes Ferritin, Iron Sat%, Iron, and TIBC)    Sciatic leg pain        Relevant Orders    Ambulatory Referral to Physical Therapy    Thyroid antibody positive        Relevant Orders    TSH, 3rd generation with Free T4 reflex    Thyroid Antibodies Panel    Crampy pain associated with menses        Relevant Medications    ibuprofen (MOTRIN) 600 mg tablet    Pectus excavatum        Relevant Orders    Echo complete w/ contrast if indicated          Immunizations and preventive care screenings were discussed with patient today  Appropriate education was printed on patient's after visit summary  Counseling:  Alcohol/drug use: discussed moderation in alcohol intake, the recommendations for healthy alcohol use, and avoidance of illicit drug use  Dental Health: discussed importance of regular tooth brushing, flossing, and dental visits  Injury prevention: discussed safety/seat belts, safety helmets, smoke detectors, carbon dioxide detectors, and smoking near bedding or upholstery  Sexual health: discussed sexually transmitted diseases, partner selection, use of condoms, avoidance of unintended pregnancy, and contraceptive alternatives  · Exercise: the importance of regular exercise/physical activity was discussed   Recommend exercise 3-5 times per week for at least 30 minutes  BMI Counseling: Body mass index is 25 5 kg/m²  The BMI is above normal  Nutrition recommendations include decreasing portion sizes, consuming healthier snacks, moderation in carbohydrate intake and reducing intake of saturated and trans fat  Exercise recommendations include moderate physical activity 150 minutes/week  Rationale for BMI follow-up plan is due to patient being overweight or obese  Healthy lifestyle counseling    Depression Screening and Follow-up Plan: No depression  Venlafaxine through Gyn  Feels well        Return in about 1 year (around 2024) for Annual physical      Chief Complaint:     Chief Complaint   Patient presents with   • Physical Exam     Discuss blood work      History of Present Illness:     Adult Annual Physical   Patient here for a comprehensive physical exam  The patient reports no problems  Diet and Physical Activity  · Diet/Nutrition: well balanced diet, low fat diet and adequate fiber intake  · Exercise: moderate cardiovascular exercise  Depression Screening  PHQ-2/9 Depression Screening    Little interest or pleasure in doing things: 0 - not at all  Feeling down, depressed, or hopeless: 0 - not at all  Trouble falling or staying asleep, or sleeping too much: 0 - not at all  Feeling tired or having little energy: 0 - not at all  Poor appetite or overeatin - not at all  Feeling bad about yourself - or that you are a failure or have let yourself or your family down: 0 - not at all  Trouble concentrating on things, such as reading the newspaper or watching television: 0 - not at all  Moving or speaking so slowly that other people could have noticed   Or the opposite - being so fidgety or restless that you have been moving around a lot more than usual: 0 - not at all  Thoughts that you would be better off dead, or of hurting yourself in some way: 0 - not at all  PHQ-9 Score: 0   PHQ-9 Interpretation: No or Minimal depression General Health  · Sleep: sleeps well  · Hearing: normal - bilateral   · Vision: no vision problems, goes for regular eye exams and wears glasses  · Dental: regular dental visits  · 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[place])       /GYN Health  · Patient is: premenopausal  · Last menstrual period: 6/3  · Contraceptive method:      · Advise daily vitamin supplements  · Women's health : Dr Gabriella Reno  · Mammo due in September (high lifetime Tyrer-Cuzick: 30/76)  · Managed by Gyn  · Advise monthly self breast exams     Review of Systems:     Review of Systems   Constitutional: Negative  HENT: Negative  Eyes: Negative  Respiratory: Negative  Cardiovascular: Negative  Gastrointestinal: Negative  Genitourinary: Negative  Musculoskeletal: Positive for back pain (sciatic pain)  Skin: Negative  Neurological: Negative  Psychiatric/Behavioral: Negative  Past Medical History:     History reviewed  No pertinent past medical history     Past Surgical History:     Past Surgical History:   Procedure Laterality Date   • VASCULAR SURGERY Bilateral       Social History:     Social History     Socioeconomic History   • Marital status: /Civil Union     Spouse name: None   • Number of children: None   • Years of education: None   • Highest education level: None   Occupational History   • None   Tobacco Use   • Smoking status: Never   • Smokeless tobacco: Never   Substance and Sexual Activity   • Alcohol use: Not Currently   • Drug use: Never   • Sexual activity: None   Other Topics Concern   • None   Social History Narrative   • None     Social Determinants of Health     Financial Resource Strain: Not on file   Food Insecurity: Not on file   Transportation Needs: Not on file   Physical Activity: Not on file   Stress: Not on file   Social Connections: Not on file   Intimate Partner Violence: Not on file   Housing Stability: Not on file      Family History:     Family "History   Problem Relation Age of Onset   • No Known Problems Mother    • No Known Problems Father    • No Known Problems Sister    • Breast cancer Maternal Grandmother 76   • Stomach cancer Maternal Grandfather 76   • No Known Problems Paternal Grandmother    • Throat cancer Paternal Grandfather 76   • Breast cancer Maternal Aunt 46   • Cervical cancer Maternal Aunt 54   • No Known Problems Paternal Aunt       Current Medications:     Current Outpatient Medications   Medication Sig Dispense Refill   • ferrous sulfate 324 (65 Fe) mg Take 1 tablet (324 mg total) by mouth 2 (two) times a day before meals 180 tablet 1   • ibuprofen (MOTRIN) 600 mg tablet Take 1 tablet (600 mg total) by mouth every 6 (six) hours as needed for mild pain or moderate pain 90 tablet 0   • venlafaxine (EFFEXOR-XR) 75 mg 24 hr capsule Take 75 mg by mouth daily  0     No current facility-administered medications for this visit  Allergies: Allergies   Allergen Reactions   • Demerol [Meperidine]    • Banana - Food Allergy    • Cefoxitin    • Latex    • Oxycodone    • Tyloxapol Rash      Physical Exam:     /70 (BP Location: Left arm, Patient Position: Sitting, Cuff Size: Standard)   Pulse 63   Temp 97 7 °F (36 5 °C) (Temporal)   Resp 16   Ht 5' 6\" (1 676 m)   Wt 71 7 kg (158 lb)   LMP 06/03/2023 (Exact Date)   SpO2 97%   BMI 25 50 kg/m²     Physical Exam  Vitals and nursing note reviewed  Constitutional:       General: She is not in acute distress  Appearance: Normal appearance  She is well-developed and well-groomed  She is not ill-appearing  HENT:      Head: Normocephalic  Right Ear: Tympanic membrane, ear canal and external ear normal       Left Ear: Tympanic membrane, ear canal and external ear normal       Nose: Nose normal       Mouth/Throat:      Mouth: Mucous membranes are moist       Pharynx: Oropharynx is clear     Eyes:      Conjunctiva/sclera: Conjunctivae normal       Pupils: Pupils are equal, " round, and reactive to light  Neck:      Thyroid: No thyromegaly  Vascular: No carotid bruit  Cardiovascular:      Rate and Rhythm: Normal rate and regular rhythm  Pulses:           Posterior tibial pulses are 2+ on the right side and 2+ on the left side  Heart sounds: Normal heart sounds  Pulmonary:      Effort: Pulmonary effort is normal  No respiratory distress  Breath sounds: Normal breath sounds and air entry  Chest:      Chest wall: Deformity present  Comments: Pectus Excavatum  Abdominal:      General: Bowel sounds are normal       Palpations: Abdomen is soft  Tenderness: There is no abdominal tenderness  Musculoskeletal:      Right lower leg: No edema  Left lower leg: No edema  Lymphadenopathy:      Cervical: No cervical adenopathy  Skin:     General: Skin is warm and dry  Neurological:      General: No focal deficit present  Mental Status: She is alert and oriented to person, place, and time  Psychiatric:         Attention and Perception: Attention normal          Mood and Affect: Mood normal          Behavior: Behavior normal          Thought Content:  Thought content normal          Judgment: Judgment normal           ALEJANDRO Vides  [address] [address]

## 2023-06-15 ENCOUNTER — EVALUATION (OUTPATIENT)
Dept: PHYSICAL THERAPY | Facility: CLINIC | Age: 43
End: 2023-06-15
Payer: COMMERCIAL

## 2023-06-15 DIAGNOSIS — M54.30 SCIATIC LEG PAIN: Primary | ICD-10-CM

## 2023-06-15 DIAGNOSIS — M54.41 ACUTE RIGHT-SIDED LOW BACK PAIN WITH RIGHT-SIDED SCIATICA: ICD-10-CM

## 2023-06-15 PROBLEM — R53.83 OTHER FATIGUE: Status: ACTIVE | Noted: 2023-06-15

## 2023-06-15 PROCEDURE — 97161 PT EVAL LOW COMPLEX 20 MIN: CPT | Performed by: PHYSICAL THERAPIST

## 2023-06-15 PROCEDURE — 97110 THERAPEUTIC EXERCISES: CPT | Performed by: PHYSICAL THERAPIST

## 2023-06-15 NOTE — PROGRESS NOTES
PT Evaluation     Today's date: 6/15/2023  Patient name: Andrew Soriano  : 1980  MRN: [id number]  Referring provider: ALEJANDRO Ivory  Dx:   Encounter Diagnosis     ICD-10-CM    1  Sciatic leg pain  M54 30 Ambulatory Referral to Physical Therapy      2  Acute right-sided low back pain with right-sided sciatica  M54 41                      Assessment/Plan  Ms Beatriz Franklin is a 44 y o  female presenting to outpatient physical therapy with radiating low back pain which restricts their ability to participate in ADLs, work, and recreational activities without pain  Functional limitations are result of the following impairments: sciatic nerve tension, extension bias, glute muscle spasm, decreased function, and pain  Symptoms are consistent with pathoanatomical diagnosis is LBP with directional preference and sciatic nerve irritation  PMH is sig for depression  No further referral appears necessary at this time based upon examination results    Patient was given the opportunity to ask questions, and all questions were answered to the patient's satisfaction  The patients's greatest concerns are the pain not getting better, fear of not being able to stay active and fear of not being able to care for self or others  Patient appears motivated, agrees with the POC, and is a good candidate for skilled physical therapy at this time  Patient was provided with handout of HEP consisting of sciatic nerve sequence    Symptom irritability: Low   Understanding of Dx/Px/POC: good  Prognosis: good  Negative prognostic indicators include: n/a    Goals  STG (3 weeks)  Pain: Patient will subjectively report maximum pain decreased by 2/10  Strength: Patient's will demonstrate R glute med strength improved by 1/2 grade  Function: Patient increase score on FOTO by 10 points    LTG (6 weeks)  Pain: Patient will subjectively report less than 2/10 pain with functional activities    Strength: Patient's will demonstrate R glute med "strength improved to WNL  Function: Patient will increase score on FOTO to predicted value or better  Patient will be ind with HEP by [facility] [address] details: Prognosis above is given PT services 2x/week over the next 6 weeks and home program adherence  Patient would benefit from: skilled physical therapy, home exercise program  Referral necessary: no  Planned modality interventions: Hydrocollator packs, Cryotherapy, TENS and Low level laser  Planned therapy interventions: joint mobilization, manual therapy, massage, neuromuscular re-education, patient education, stretching, strengthening, therapeutic activities, therapeutic exercise, home exercise program, functional ROM exercises, gait training, flexibility, balance, abdominal trunk stabilization, motor coordination training, coordination and behavior modification  Frequency: 2x/week for 6 weeks  Duration in visits: 12  Plan of Care beginning date: 6/15/2023   Plan of Care expiration date: 7/27/2023  Treatment plan discussed with: patient    Subjective  IE (6/15/2023): Patient is a 44 y o  female who presents for an initial outpatient physical therapy consultation regarding their R glute and shooting leg pain that has been going on for 1 month  She denies injury or incident that lead to her symptoms  She described her pain as cramping or spasm of the R glute with occasional shooting pain down her lateral leg to her anterior ankle  \"Feels like a knot that I can't get out\"  She gets occasionally numbness and tingling down her leg  Etiology: insidious  Aggravating factors: driving, walking long distances, exercising  Alleviating factors: massage gun, stretching, movement  Consultations: Patient reports that they consulted their PCP, and they were referred to PT    Pain  Best: 2/10  Worst: 7/10  Irritability: hours  Location: R glute and radiates down     Quality: pressure  Progression: not changing    Social Support  Home support: family  Employment status: " "teacher  Hobbies/Recreational Activities: working out, pelaton    Diagnostic Tests  None to date    Patient Goals for Therapy  \"get rid of the pain  \"    Objective  Observation:  · Posture: fair    Red Flag screen: all (-)  Recent fever, chills, or infection:  Hx of IV drug use  Bowel or Bladder changes:  Saddle anesthesia:  Hx of cancer:  Unremitting night or resting pain:  Unexplained weight loss:  Hx of fall:     Neurologic Screen  · Dermatomes: Able to recreated shooting pain along L5 nerve path with sciatic nerve tensioning  · Reflexes: unremarkable  · Myotomes: unremarkable    Joint play  Lumbar PA assessment: mild hypomobility  Thoracic PA assessment: mild hypomobility    Palpation  Paraspinals: not TTP  QL: not TTP  Gluteals: mild TTP  Piriformis: mild TTP      ROM    Lumbar  • Flexion  o ROM: WNL  o Repeated motions: peripheralized pain to just distal to glute  • Extension  o ROM: mild limitation  o Repeated motions: centralized pain to low back       Left- PROM Right- PROM   Hip flexion 120 120   Hip extension 0 0   Hip IR 20 20   Hip ER 70 70   Hip ABD 45 45   ROM Comments:    Strength     Left Right   Hip flexion 5 5   Hip extension 4+ 4+   Hip ABD 4 4        Knee flexion 5 5   Knee extension 5 5        Ankle DF 5 5   Ankle PF 5 5   Strength comments:       Tests  · SIJ cluster: (-)  · Prone instability: (-)  · SLR: (+), recreates nerve pain  · X-SLR: (-)  · Slump: (+)  · VASQUEZ: (-)  · FADIR: (-)           Precautions: n/a      Manuals 6/15            Sciatic sequence             Nerve glide             Piriformis STM                          Neuro Re-Ed             Sciatic sequence  -SKTC  -supine piriformis  -Supine HS  -Supine heel pumps             Sciatic nerve glides             Clamshells                                                                 Ther Ex             bike             Hip 3 ways             HS on step                                                                            " Ther Activity                                       Gait Training                                       Modalities                          IE/HEP JF

## 2023-06-15 NOTE — ASSESSMENT & PLAN NOTE
Suspect multi factorial   Check labs:    CBC, TSH, Iron, Vitamin D (past deficiency)  Encourage good self care; good sleep hygiene    F/U once labs completed to review   Due for annual physical

## 2023-06-20 ENCOUNTER — OFFICE VISIT (OUTPATIENT)
Dept: PHYSICAL THERAPY | Facility: CLINIC | Age: 43
End: 2023-06-20
Payer: COMMERCIAL

## 2023-06-20 DIAGNOSIS — M54.30 SCIATIC LEG PAIN: Primary | ICD-10-CM

## 2023-06-20 DIAGNOSIS — M54.41 ACUTE RIGHT-SIDED LOW BACK PAIN WITH RIGHT-SIDED SCIATICA: ICD-10-CM

## 2023-06-20 PROCEDURE — 97140 MANUAL THERAPY 1/> REGIONS: CPT | Performed by: PHYSICAL THERAPIST

## 2023-06-20 PROCEDURE — 97112 NEUROMUSCULAR REEDUCATION: CPT | Performed by: PHYSICAL THERAPIST

## 2023-06-20 PROCEDURE — 97110 THERAPEUTIC EXERCISES: CPT | Performed by: PHYSICAL THERAPIST

## 2023-06-20 NOTE — PROGRESS NOTES
"Daily Note     Today's date: 2023  Patient name: Germán Saldivar  : 1980  MRN: [id number]  Referring provider: ALEJANDRO Metzger  Dx:   Encounter Diagnosis     ICD-10-CM    1  Sciatic leg pain  M54 30       2  Acute right-sided low back pain with right-sided sciatica  M54 41                      Subjective: Patient reports that her symptoms have improved mildly with exercises as home  She notes that she still has the \"pinch\" in her glute with movement that stretch the nerve  Objective: See treatment diary below  Treatment as indicated below  Assessment: Reviewed HEP to ensure proper form and to answer any questions regarding prescribed exercises  Patient demonstrates good recall and form with minimal cuing  Patient noted mild improvement in symptoms following manual therapy  Added additional glute stretching and strengthening to HEP  Continue to progress as tolerated  Patient will continue to benefit from skilled PT in order to address impairments and improve function  Plan: Continue per plan of care        Precautions: n/a      Manuals 6/15 6/20           Sciatic sequence  JF           Nerve glide  JF           Piriformis STM  JF                        Neuro Re-Ed             Sciatic sequence  -SKTC  -supine piriformis  -Supine HS  -Supine heel pumps  10x10\"           Sciatic nerve glides  2x10  10\"           Clamshells  BTB  3x10           Bridges  BTB  3x10                                                  Ther Ex             Treadmill  5 min           Monster walk  GTB  3 steps ea way    x10           HS stretch on step  5x15\"           Glute step stretch  5x15\"           S/L leg press  95#  3x10                                                  Ther Activity                                       Gait Training                                       Modalities                          IE/HEP JF                 "

## 2023-06-22 ENCOUNTER — OFFICE VISIT (OUTPATIENT)
Dept: PHYSICAL THERAPY | Facility: CLINIC | Age: 43
End: 2023-06-22
Payer: COMMERCIAL

## 2023-06-22 DIAGNOSIS — M54.30 SCIATIC LEG PAIN: Primary | ICD-10-CM

## 2023-06-22 DIAGNOSIS — M54.41 ACUTE RIGHT-SIDED LOW BACK PAIN WITH RIGHT-SIDED SCIATICA: ICD-10-CM

## 2023-06-22 PROCEDURE — 97140 MANUAL THERAPY 1/> REGIONS: CPT | Performed by: PHYSICAL THERAPIST

## 2023-06-22 PROCEDURE — 97110 THERAPEUTIC EXERCISES: CPT | Performed by: PHYSICAL THERAPIST

## 2023-06-22 PROCEDURE — 97112 NEUROMUSCULAR REEDUCATION: CPT | Performed by: PHYSICAL THERAPIST

## 2023-06-22 NOTE — PROGRESS NOTES
"Daily Note     Today's date: 2023  Patient name: Anna Omalley  : 1980  MRN: [id number]  Referring provider: ALEJANDRO Vargas  Dx:   Encounter Diagnosis     ICD-10-CM    1  Sciatic leg pain  M54 30       2  Acute right-sided low back pain with right-sided sciatica  M54 41                      Subjective: Patient reports that she had mild soreness and fatigue following last session  Today, her glute and leg are feeling better  Noting less pinching  Objective: See treatment diary below  Progressed treatment as indicated below  Assessment: Added additional glute stretching and nerve gliding exercises this session  Patient had less pinching and no leg symptoms throughout session  She is trending the right direction and we will consider reducing frequency pending weekend trip that requires driving long distance  Continue to progress as tolerated  Patient will continue to benefit from skilled PT in order to address impairments and improve function  Plan: Continue per plan of care        Precautions: n/a      Manuals 6/15 6/20 6/22          Sciatic sequence  JF JF          Nerve glide  JF JF          Piriformis STM  JF                        Neuro Re-Ed             Sciatic sequence  -SKTC  -supine piriformis  -Supine HS  -Supine heel pumps  10x10\" 10x10\"          Sciatic nerve glides  2x10  10\" 2x10  10\"          Clamshells  BTB  3x10 BTB  3x10          Bridges  BTB  3x10 BTB  3x10          sidelying hip extension with band   GTB  2x10          Standing B/L nerve glide   2x10                       Ther Ex             Treadmill  5 min 5 min          Monster walk  GTB  3 steps ea way    x10 GTB  3 steps ea way    x10            HS stretch on step  5x15\" 5x15\"          Glute step stretch  5x15\" 5x15\"          S/L leg press  95#  3x10 95#  3x10                                                 Ther Activity                                       Gait Training                                  " Modalities                          IE/HEP JF

## 2023-06-27 ENCOUNTER — OFFICE VISIT (OUTPATIENT)
Dept: PHYSICAL THERAPY | Facility: CLINIC | Age: 43
End: 2023-06-27
Payer: COMMERCIAL

## 2023-06-27 DIAGNOSIS — M54.30 SCIATIC LEG PAIN: ICD-10-CM

## 2023-06-27 DIAGNOSIS — M54.41 ACUTE RIGHT-SIDED LOW BACK PAIN WITH RIGHT-SIDED SCIATICA: Primary | ICD-10-CM

## 2023-06-27 PROCEDURE — 97140 MANUAL THERAPY 1/> REGIONS: CPT | Performed by: PHYSICAL THERAPIST

## 2023-06-27 PROCEDURE — 97112 NEUROMUSCULAR REEDUCATION: CPT | Performed by: PHYSICAL THERAPIST

## 2023-06-27 PROCEDURE — 97110 THERAPEUTIC EXERCISES: CPT | Performed by: PHYSICAL THERAPIST

## 2023-06-27 NOTE — PROGRESS NOTES
"Daily Note     Today's date: 2023  Patient name: Erik Ibarra  : 1980  MRN: [id number]  Referring provider: ALEJANDRO Bardales  Dx:   Encounter Diagnosis     ICD-10-CM    1  Acute right-sided low back pain with right-sided sciatica  M54 41       2  Sciatic leg pain  M54 30                      Subjective: Patient reports that she is having more soreness and pinching in the glute today, which she contributes walking on sand and driving long distance to the beach  Objective: See treatment diary below  Progressed treatment as indicated below  Assessment: Patient was able to tolerate progression of resistance exercises without an increase in pain  They demonstrated muscular fatigue as expected with progression  She continues to trend the right direction and we will consider reducing frequency nv  Continue to progress as tolerated  Patient will continue to benefit from skilled PT in order to address impairments and improve function  Plan: Continue per plan of care        Precautions: n/a      Manuals 6/15 6/20 6/22 6/27         Sciatic sequence  JF JF JF         Nerve glide  JF JF JF         Piriformis STM  JF                        Neuro Re-Ed             Sciatic sequence  -SKTC  -supine piriformis  -Supine HS  -Supine heel pumps  10x10\" 10x10\" 10x10\"         Sciatic nerve glides  2x10  10\" 2x10  10\" 2x10  10\"         Clamshells  BTB  3x10 BTB  3x10 BTB  3x10         Bridges  BTB  3x10 BTB  3x10 BTB  2x10         sidelying hip extension with band   GTB  2x10 GTB  2x10         Standing B/L nerve glide   2x10 2x10         Standing hip ABD into ball on wall    2x10         Ther Ex             Treadmill  5 min 5 min 5 min walk         Monster walk  GTB  3 steps ea way    x10 GTB  3 steps ea way    x10   GTB  3 steps ea way    x10           HS stretch on step  5x15\" 5x15\" 5x15\"         Glute step stretch  5x15\" 5x15\" 5x15\"         S/L leg press  95#  3x10 95#  3x10 105#  3x10              " Ther Activity                                       Gait Training                                       Modalities                          IE/HEP JF

## 2023-06-29 ENCOUNTER — OFFICE VISIT (OUTPATIENT)
Dept: PHYSICAL THERAPY | Facility: CLINIC | Age: 43
End: 2023-06-29
Payer: COMMERCIAL

## 2023-06-29 DIAGNOSIS — M54.41 ACUTE RIGHT-SIDED LOW BACK PAIN WITH RIGHT-SIDED SCIATICA: Primary | ICD-10-CM

## 2023-06-29 DIAGNOSIS — M54.30 SCIATIC LEG PAIN: ICD-10-CM

## 2023-06-29 PROCEDURE — 97112 NEUROMUSCULAR REEDUCATION: CPT | Performed by: PHYSICAL THERAPIST

## 2023-06-29 PROCEDURE — 97110 THERAPEUTIC EXERCISES: CPT | Performed by: PHYSICAL THERAPIST

## 2023-06-29 PROCEDURE — 97140 MANUAL THERAPY 1/> REGIONS: CPT | Performed by: PHYSICAL THERAPIST

## 2023-06-29 NOTE — PROGRESS NOTES
PT Discharge     Today's date: 2023   Patient name: Reynaldo Marques  : 1980  MRN: [id number]  Referring provider: ALEJANDRO Perez  Dx:   Encounter Diagnosis     ICD-10-CM    1  Sciatic leg pain  M54 30 Ambulatory Referral to Physical Therapy      2  Acute right-sided low back pain with right-sided sciatica  M54 41                      Assessment/Plan  Patient is a 44 y o  female presenting to OP PT radicular back pain  They have attended 5 visits over 2 weeks  Since starting therapy pt has made the following progress towards goals:  1) Pain: Patient reports that their max pain has decreased to 2/10  from 7/10  2) Range of motion: Patient's hip ROM improved to WNL  3 )Strength: Patient's  strength has improved to 5/5  4) Self rated functional score: FOTO score has increase to surpass expected value    Reynaldo Marques has been compliant with attending PT and home exercise program since initial eval  she has made improvements in objective data and achieved desired functional goals  Patient reports having returned to their prior level or function  It was mutually agreed to discharge to home exercise program at this time  Patient has good understanding of provided home exercise program and was instructed to call with any questions or if issues should arise  See updated goals below  Goals  STG (3 weeks)  Pain: Patient will subjectively report maximum pain decreased by 2/10- MET  Strength: Patient's will demonstrate R glute med strength improved by 1/2 grade- MET  Function: Patient increase score on FOTO by 10 points- MET    LTG (6 weeks)  Pain: Patient will subjectively report less than 2/10 pain with functional activities  - MET  Strength: Patient's will demonstrate R glute med strength improved to WNL- MET  Function: Patient will increase score on FOTO to predicted value or better- MET  Patient will be ind with HEP by [address] [place] [address] [address] details: DC to home with HEP    Patient would benefit "from: home exercise program  Treatment plan discussed with: patient    Subjective  UPDATE 6/29/2023: Patient reports that her leg pain is much better  No longer having glute cramping or shooting pain down her leg  She notes that she occasionally has the pinching sensation in her R glute, but its not nearly as bad as it was  Overall, patient thinks that her glute and LE is 95% better  She is confident that she can continue to improve on her own  IE (6/15/2023): Patient is a 44 y o  female who presents for an initial outpatient physical therapy consultation regarding their R glute and shooting leg pain that has been going on for 1 month  She denies injury or incident that lead to her symptoms  She described her pain as cramping or spasm of the R glute with occasional shooting pain down her lateral leg to her anterior ankle  \"Feels like a knot that I can't get out\"  She gets occasionally numbness and tingling down her leg  Etiology: insidious  Aggravating factors: driving, walking long distances, exercising  Alleviating factors: massage gun, stretching, movement  Consultations: Patient reports that they consulted their PCP, and they were referred to PT    Pain  Best: 0/10 (was 2/10)  Worst: 1/10 (was 7/10)  Irritability: hours  Location: R glute and radiates down  Quality: pressure  Progression: not changing    Social Support  Home support: family  Employment status: teacher  Hobbies/Recreational Activities: working out, pelaton    Diagnostic Tests  None to date    Patient Goals for Therapy  \"get rid of the pain  \"    Objective  Observation:  · Posture: fair    Neurologic Screen  · Dermatomes: Able to recreated shooting pain along L5 nerve path with sciatic nerve tensioning  · 6/29/2023: now unremarkable  · Reflexes: unremarkable  · Myotomes: unremarkable    Joint play  Lumbar PA assessment: mild hypomobility  Thoracic PA assessment: mild hypomobility    Palpation  Paraspinals: not TTP  QL: not TTP  Gluteals: " "mild TTP   6/29/2023 no longer TTP  Piriformis: mild TTP   6/29/2023: no longer TTP      ROM    Lumbar  • Flexion  o ROM: WNL  o Repeated motions: peripheralized pain to just distal to glute  • Extension  o ROM: mild limitation  o Repeated motions: centralized pain to low back       Left- PROM Right- PROM   Hip flexion 130 (was 120) 130 (was 120)   Hip extension 0 0   Hip IR 20 20   Hip ER 70 70   Hip ABD 45 45   ROM Comments:    Strength     Left Right   Hip flexion 5 5   Hip extension 5 (was 4+) 5 (was 4+)   Hip ABD 4+ (was 4) 4+ (was 4)        Knee flexion 5 5   Knee extension 5 5        Ankle DF 5 5   Ankle PF 5 5   Strength comments:       Tests  · SIJ cluster: (-)  · Prone instability: (-)  · SLR: (+), recreates nerve pain  · 6/29/2023: (-) now  · X-SLR: (-)  · Slump: (+)  · VASQUEZ: (-)  · FADIR: (-)      Precautions: n/a       Manuals 6/15 6/20 6/22 6/27  6/29             Sciatic sequence   JF JF JF  JF             Nerve glide   JF JF JF  JF             Piriformis STM   JF                                           Neuro Re-Ed                       Sciatic sequence  -SKTC  -supine piriformis  -Supine HS  -Supine heel pumps   10x10\" 10x10\" 10x10\"  10x10\"             Sciatic nerve glides   2x10  10\" 2x10  10\" 2x10  10\"  2x10 10\"             Clamshells   BTB  3x10 BTB  3x10 BTB  3x10  Purple  3x10             Bridges   BTB  3x10 BTB  3x10 BTB  2x10  Purple  3x10             sidelying hip extension with band     GTB  2x10 GTB  2x10  GTB  2x10             Standing B/L nerve glide     2x10 2x10  2x10             Standing hip ABD into ball on wall       2x10  2x10             Ther Ex                       Treadmill   5 min 5 min 5 min walk  5 min walk             Monster walk   GTB  3 steps ea way     x10 GTB  3 steps ea way     x10    GTB  3 steps ea way     x10     GTB  3 steps ea way    x10             HS stretch on step   5x15\" 5x15\" 5x15\"  5x15\"             Glute step stretch   5x15\" 5x15\" 5x15\"  5x15\"           " S/L leg press   95#  3x10 95#  3x10 105#  3x10  105#  3x10                                                                                     Ther Activity                                                                       Gait Training                                                                       Modalities                                               IE/HEP JF

## 2023-10-25 DIAGNOSIS — R76.8 THYROID ANTIBODY POSITIVE: Primary | ICD-10-CM

## 2023-10-25 DIAGNOSIS — E61.1 IRON DEFICIENCY: ICD-10-CM

## 2023-12-08 PROBLEM — R79.89 ABNORMAL THYROID BLOOD TEST: Status: ACTIVE | Noted: 2023-12-08

## 2023-12-29 ENCOUNTER — HOSPITAL ENCOUNTER (OUTPATIENT)
Dept: NON INVASIVE DIAGNOSTICS | Facility: HOSPITAL | Age: 43
Discharge: HOME | End: 2023-12-29
Payer: COMMERCIAL

## 2023-12-29 VITALS
BODY MASS INDEX: 25.39 KG/M2 | HEART RATE: 73 BPM | WEIGHT: 158 LBS | DIASTOLIC BLOOD PRESSURE: 70 MMHG | SYSTOLIC BLOOD PRESSURE: 110 MMHG | HEIGHT: 66 IN

## 2023-12-29 DIAGNOSIS — Q67.6 PECTUS EXCAVATUM: ICD-10-CM

## 2023-12-29 LAB
AORTIC ROOT: 2.7 CM
AORTIC VALVE MEAN VELOCITY: 9.2 M/S
APICAL FOUR CHAMBER EJECTION FRACTION: 58 %
AV AREA BY CONTINUOUS VTI: 2.6 CM2
AV AREA PEAK VELOCITY: 2.6 CM2
AV LVOT MEAN GRADIENT: 3 MMHG
AV LVOT PEAK GRADIENT: 5 MMHG
AV MEAN GRADIENT: 4 MMHG
AV PEAK GRADIENT: 7 MMHG
AV VALVE AREA: 2.63 CM2
AV VELOCITY RATIO: 0.82
DOP CALC AO PEAK VEL: 1.31 M/S
DOP CALC AO VTI: 28.71 CM
DOP CALC LVOT AREA: 3.14 CM2
DOP CALC LVOT CARDIAC INDEX: 2.5 L/MIN/M2
DOP CALC LVOT CARDIAC OUTPUT: 4.52 L/MIN
DOP CALC LVOT DIAMETER: 2 CM
DOP CALC LVOT PEAK VEL VTI: 24.05 CM
DOP CALC LVOT PEAK VEL: 1.08 M/S
DOP CALC LVOT STROKE INDEX: 39.8 ML/M2
DOP CALC LVOT STROKE VOLUME: 75.52
E WAVE DECELERATION TIME: 140 MS
E/A RATIO: 2.15
FRACTIONAL SHORTENING: 33 (ref 28–44)
INTERVENTRICULAR SEPTUM IN DIASTOLE (PARASTERNAL SHORT AXIS VIEW): 1 CM
INTERVENTRICULAR SEPTUM: 1 CM (ref 0.6–1.1)
LAAS-AP4: 22 CM2
LEFT ATRIUM AREA SYSTOLE SINGLE PLANE A4C: 20.4 CM2
LEFT ATRIUM SIZE: 2.9 CM
LEFT INTERNAL DIMENSION IN SYSTOLE: 2.8 CM (ref 2.1–4)
LEFT VENTRICULAR INTERNAL DIMENSION IN DIASTOLE: 4.2 CM (ref 3.5–6)
LEFT VENTRICULAR POSTERIOR WALL IN END DIASTOLE: 1 CM
LEFT VENTRICULAR STROKE VOLUME: 47 ML
LVSV (TEICH): 47 ML
MV E'TISSUE VEL-LAT: 25 CM/S
MV E'TISSUE VEL-SEP: 16 CM/S
MV PEAK A VEL: 0.34 M/S
MV PEAK E VEL: 73 CM/S
MV STENOSIS PRESSURE HALF TIME: 41 MS
MV VALVE AREA P 1/2 METHOD: 5.37
RIGHT ATRIUM AREA SYSTOLE A4C: 13.9 CM2
RIGHT VENTRICLE ID DIMENSION: 2.9 CM
SL CV PED ECHO LEFT VENTRICLE DIASTOLIC VOLUME (MOD BIPLANE) 2D: 78 ML
SL CV PED ECHO LEFT VENTRICLE SYSTOLIC VOLUME (MOD BIPLANE) 2D: 31 ML
TR MAX PG: 15 MMHG
TR PEAK VELOCITY: 2 M/S
TRICUSPID ANNULAR PLANE SYSTOLIC EXCURSION: 1.3 CM
TRICUSPID VALVE PEAK REGURGITATION VELOCITY: 1.96 M/S

## 2023-12-29 PROCEDURE — 93306 TTE W/DOPPLER COMPLETE: CPT

## 2023-12-29 PROCEDURE — 93306 TTE W/DOPPLER COMPLETE: CPT | Performed by: INTERNAL MEDICINE

## 2024-01-30 PROBLEM — R79.0 LOW FERRITIN: Status: ACTIVE | Noted: 2024-01-30

## 2024-01-30 PROBLEM — R76.8 THYROID ANTIBODY POSITIVE: Status: ACTIVE | Noted: 2024-01-30

## 2024-04-16 ENCOUNTER — OFFICE VISIT (OUTPATIENT)
Dept: FAMILY MEDICINE CLINIC | Facility: CLINIC | Age: 44
End: 2024-04-16
Payer: COMMERCIAL

## 2024-04-16 VITALS
HEART RATE: 101 BPM | DIASTOLIC BLOOD PRESSURE: 72 MMHG | HEIGHT: 66 IN | SYSTOLIC BLOOD PRESSURE: 120 MMHG | WEIGHT: 155 LBS | OXYGEN SATURATION: 96 % | BODY MASS INDEX: 24.91 KG/M2 | TEMPERATURE: 97.8 F

## 2024-04-16 DIAGNOSIS — J01.90 ACUTE SINUSITIS, RECURRENCE NOT SPECIFIED, UNSPECIFIED LOCATION: Primary | ICD-10-CM

## 2024-04-16 PROCEDURE — 99214 OFFICE O/P EST MOD 30 MIN: CPT | Performed by: FAMILY MEDICINE

## 2024-04-16 RX ORDER — AMOXICILLIN AND CLAVULANATE POTASSIUM 875; 125 MG/1; MG/1
1 TABLET, FILM COATED ORAL EVERY 12 HOURS SCHEDULED
Qty: 14 TABLET | Refills: 0 | Status: SHIPPED | OUTPATIENT
Start: 2024-04-16 | End: 2024-04-23

## 2024-04-16 NOTE — PROGRESS NOTES
Assessment/Plan:   1. Acute sinusitis, recurrence not specified, unspecified location  Start supportive care.  Maintain hydration.  Take over-the-counter Mucinex.  Start treatment with Augmentin.  Follow-up if any symptoms persist.  - amoxicillin-clavulanate (AUGMENTIN) 875-125 mg per tablet; Take 1 tablet by mouth every 12 (twelve) hours for 7 days  Dispense: 14 tablet; Refill: 0           There are no diagnoses linked to this encounter.      Subjective:       Chief Complaint   Patient presents with    Cough     For 6 days    Nasal Congestion      Patient ID: Lemuel Gamboa is a 43 y.o. female.    Cough  This is a new problem. The current episode started in the past 7 days. The problem has been unchanged. The cough is Productive of sputum. Associated symptoms include nasal congestion, shortness of breath and wheezing. Pertinent negatives include no chest pain, chills, ear congestion, ear pain, eye redness, fever, headaches, myalgias or sore throat. She has tried OTC cough suppressant (advil and tylenol) for the symptoms. There is no history of environmental allergies.       Review of Systems   Constitutional:  Negative for activity change, chills, fatigue and fever.   HENT:  Negative for congestion, ear pain, sinus pressure and sore throat.    Eyes:  Negative for redness, itching and visual disturbance.   Respiratory:  Positive for cough, shortness of breath and wheezing.    Cardiovascular:  Negative for chest pain and palpitations.   Gastrointestinal:  Negative for abdominal pain, diarrhea and nausea.   Endocrine: Negative for cold intolerance and heat intolerance.   Genitourinary:  Negative for dysuria, flank pain and frequency.   Musculoskeletal:  Negative for arthralgias, back pain, gait problem and myalgias.   Skin:  Negative for color change.   Allergic/Immunologic: Negative for environmental allergies.   Neurological:  Negative for dizziness, numbness and headaches.   Psychiatric/Behavioral:  Negative for  "behavioral problems and sleep disturbance.        The following portions of the patient's history were reviewed and updated as appropriate : past family history, past medical history, past social history and past surgical history.    Current Outpatient Medications:     Cholecalciferol 25 MCG (1000 UT) capsule, Take 2,000 Units by mouth, Disp: , Rfl:     ferrous sulfate 324 (65 Fe) mg, Take 1 tablet (324 mg total) by mouth 2 (two) times a day before meals, Disp: 180 tablet, Rfl: 1    ibuprofen (MOTRIN) 600 mg tablet, Take 1 tablet (600 mg total) by mouth every 6 (six) hours as needed for mild pain or moderate pain, Disp: 90 tablet, Rfl: 0    tretinoin (RETIN-A) 0.025 % cream, APPLY A PEA-SIZE TO THE ENTIRE FACE ONCE DAILY AT BEDTIME AFTER MOISTURIZER. BEGIN 3 TIMES A WEEK (M,W,F) INCREASING TO NIGHTLY AS TOLERATED., Disp: , Rfl:     venlafaxine (EFFEXOR-XR) 75 mg 24 hr capsule, Take 75 mg by mouth daily, Disp: , Rfl: 0         Objective:         Vitals:    04/16/24 1710   BP: 120/72   BP Location: Left arm   Patient Position: Sitting   Cuff Size: Adult   Pulse: 101   Temp: 97.8 °F (36.6 °C)   TempSrc: Temporal   SpO2: 96%   Weight: 70.3 kg (155 lb)   Height: 5' 6\" (1.676 m)     Physical Exam  Vitals reviewed.   Constitutional:       General: She is not in acute distress.     Appearance: Normal appearance. She is well-developed.   HENT:      Head: Normocephalic and atraumatic.      Right Ear: Tympanic membrane, ear canal and external ear normal. There is no impacted cerumen.      Left Ear: Tympanic membrane, ear canal and external ear normal. There is no impacted cerumen.      Nose: Nose normal. No congestion or rhinorrhea.      Mouth/Throat:      Mouth: Mucous membranes are moist.      Pharynx: No oropharyngeal exudate or posterior oropharyngeal erythema.   Eyes:      General: No scleral icterus.        Right eye: No discharge.         Left eye: No discharge.      Extraocular Movements: Extraocular movements intact. "      Conjunctiva/sclera: Conjunctivae normal.      Pupils: Pupils are equal, round, and reactive to light.   Neck:      Trachea: No tracheal deviation.   Cardiovascular:      Rate and Rhythm: Normal rate and regular rhythm.      Pulses: Normal pulses.           Dorsalis pedis pulses are 2+ on the right side and 2+ on the left side.        Posterior tibial pulses are 2+ on the right side and 2+ on the left side.      Heart sounds: Normal heart sounds. No murmur heard.     No friction rub. No gallop.   Pulmonary:      Effort: Pulmonary effort is normal. No respiratory distress.      Breath sounds: Normal breath sounds. No wheezing, rhonchi or rales.   Abdominal:      General: Bowel sounds are normal. There is no distension.      Palpations: Abdomen is soft.      Tenderness: There is no abdominal tenderness. There is no guarding or rebound.   Musculoskeletal:         General: Normal range of motion.      Cervical back: Normal range of motion and neck supple.      Right lower leg: No edema.      Left lower leg: No edema.   Lymphadenopathy:      Head:      Right side of head: No submental or submandibular adenopathy.      Left side of head: No submental or submandibular adenopathy.      Cervical: No cervical adenopathy.      Right cervical: No superficial, deep or posterior cervical adenopathy.     Left cervical: No superficial, deep or posterior cervical adenopathy.   Skin:     General: Skin is warm and dry.      Findings: No erythema.   Neurological:      General: No focal deficit present.      Mental Status: She is alert and oriented to person, place, and time.      Cranial Nerves: No cranial nerve deficit.      Sensory: Sensation is intact. No sensory deficit.      Motor: Motor function is intact.   Psychiatric:         Attention and Perception: Attention and perception normal.         Mood and Affect: Mood is not anxious or depressed.         Speech: Speech normal.         Behavior: Behavior normal.         Thought  Content: Thought content normal.         Judgment: Judgment normal.

## 2025-06-02 ENCOUNTER — TELEPHONE (OUTPATIENT)
Dept: FAMILY MEDICINE CLINIC | Facility: CLINIC | Age: 45
End: 2025-06-02

## 2025-07-30 ENCOUNTER — APPOINTMENT (OUTPATIENT)
Dept: LAB | Facility: CLINIC | Age: 45
End: 2025-07-30
Payer: COMMERCIAL

## 2025-07-30 DIAGNOSIS — N92.1 METRORRHAGIA: ICD-10-CM

## 2025-07-30 LAB
25(OH)D3 SERPL-MCNC: 45.4 NG/ML (ref 30–100)
BASOPHILS # BLD AUTO: 0.07 THOUSANDS/ÂΜL (ref 0–0.1)
BASOPHILS NFR BLD AUTO: 2 % (ref 0–1)
EOSINOPHIL # BLD AUTO: 0.29 THOUSAND/ÂΜL (ref 0–0.61)
EOSINOPHIL NFR BLD AUTO: 7 % (ref 0–6)
ERYTHROCYTE [DISTWIDTH] IN BLOOD BY AUTOMATED COUNT: 13.1 % (ref 11.6–15.1)
HCT VFR BLD AUTO: 40.8 % (ref 34.8–46.1)
HGB BLD-MCNC: 13.7 G/DL (ref 11.5–15.4)
IMM GRANULOCYTES # BLD AUTO: 0 THOUSAND/UL (ref 0–0.2)
IMM GRANULOCYTES NFR BLD AUTO: 0 % (ref 0–2)
LYMPHOCYTES # BLD AUTO: 2.09 THOUSANDS/ÂΜL (ref 0.6–4.47)
LYMPHOCYTES NFR BLD AUTO: 48 % (ref 14–44)
MCH RBC QN AUTO: 32.4 PG (ref 26.8–34.3)
MCHC RBC AUTO-ENTMCNC: 33.6 G/DL (ref 31.4–37.4)
MCV RBC AUTO: 97 FL (ref 82–98)
MONOCYTES # BLD AUTO: 0.52 THOUSAND/ÂΜL (ref 0.17–1.22)
MONOCYTES NFR BLD AUTO: 12 % (ref 4–12)
NEUTROPHILS # BLD AUTO: 1.33 THOUSANDS/ÂΜL (ref 1.85–7.62)
NEUTS SEG NFR BLD AUTO: 31 % (ref 43–75)
NRBC BLD AUTO-RTO: 0 /100 WBCS
PLATELET # BLD AUTO: 358 THOUSANDS/UL (ref 149–390)
PMV BLD AUTO: 10.9 FL (ref 8.9–12.7)
RBC # BLD AUTO: 4.23 MILLION/UL (ref 3.81–5.12)
TSH SERPL DL<=0.05 MIU/L-ACNC: 1.06 UIU/ML (ref 0.45–4.5)
WBC # BLD AUTO: 4.3 THOUSAND/UL (ref 4.31–10.16)

## 2025-07-30 PROCEDURE — 82306 VITAMIN D 25 HYDROXY: CPT

## 2025-07-30 PROCEDURE — 84443 ASSAY THYROID STIM HORMONE: CPT

## 2025-07-30 PROCEDURE — 85025 COMPLETE CBC W/AUTO DIFF WBC: CPT

## 2025-07-30 PROCEDURE — 36415 COLL VENOUS BLD VENIPUNCTURE: CPT

## 2025-08-11 ENCOUNTER — APPOINTMENT (OUTPATIENT)
Age: 45
End: 2025-08-11
Attending: ORTHOPAEDIC SURGERY
Payer: COMMERCIAL

## 2025-08-11 ENCOUNTER — OFFICE VISIT (OUTPATIENT)
Age: 45
End: 2025-08-11
Payer: COMMERCIAL

## 2025-08-11 PROBLEM — M23.91 INTERNAL DERANGEMENT OF RIGHT KNEE: Status: ACTIVE | Noted: 2025-08-11

## 2025-08-22 ENCOUNTER — HOSPITAL ENCOUNTER (OUTPATIENT)
Dept: MRI IMAGING | Facility: HOSPITAL | Age: 45
Discharge: HOME | End: 2025-08-22
Attending: ORTHOPAEDIC SURGERY
Payer: COMMERCIAL

## 2025-08-22 DIAGNOSIS — M23.91 INTERNAL DERANGEMENT OF RIGHT KNEE: ICD-10-CM

## 2025-08-22 PROCEDURE — 73721 MRI JNT OF LWR EXTRE W/O DYE: CPT
